# Patient Record
Sex: FEMALE | Race: WHITE | NOT HISPANIC OR LATINO | Employment: UNEMPLOYED | URBAN - METROPOLITAN AREA
[De-identification: names, ages, dates, MRNs, and addresses within clinical notes are randomized per-mention and may not be internally consistent; named-entity substitution may affect disease eponyms.]

---

## 2017-01-13 ENCOUNTER — TELEPHONE (OUTPATIENT)
Dept: ORTHOPEDICS | Facility: CLINIC | Age: 37
End: 2017-01-13

## 2017-01-13 NOTE — TELEPHONE ENCOUNTER
----- Message from Cherie Gotti sent at 1/13/2017 12:14 PM CST -----  Contact: Self - [phone]  Patient returned phone call from nurse. Please call back at [phone]

## 2017-01-13 NOTE — TELEPHONE ENCOUNTER
----- Message from Suzette Lui sent at 1/13/2017  9:37 AM CST -----  Contact: patient  Patient calling in regards to f/u on her pain management referral. She wants to know if the Doctor found someone for her to see. She also wants the Doctor to know she is still having the shoulder pain. Please advise.  Call back .  Thanks!

## 2017-01-30 ENCOUNTER — TELEPHONE (OUTPATIENT)
Dept: ORTHOPEDICS | Facility: CLINIC | Age: 37
End: 2017-01-30

## 2017-01-30 NOTE — TELEPHONE ENCOUNTER
----- Message from Valencia Hernandes sent at 1/30/2017  8:10 AM CST -----  Patient requested refill on  Percet 5, call into pharmacy below. Please call patient at  [phone] if you have any questions. Thank you.         [facility] [place] - [address] [place] [address]  Phone: [phone] Fax: [phone]

## 2017-01-30 NOTE — TELEPHONE ENCOUNTER
----- Message from Geovani Moreland sent at 1/30/2017  2:16 PM CST -----  Contact: Self-   [phone]  Patient states she is returning the nurse phone call. Call was placed to the pod.Thanks!

## 2017-01-30 NOTE — TELEPHONE ENCOUNTER
Spoke to patient and advised that Dr. Shannon recommends that she speak to her pain management dr as she sees Dr. Jones. Patient stated understanding.

## 2017-01-31 PROBLEM — E78.5 HYPERLIPIDEMIA: Status: ACTIVE | Noted: 2017-01-31

## 2018-06-05 ENCOUNTER — HOSPITAL ENCOUNTER (EMERGENCY)
Facility: HOSPITAL | Age: 38
End: 2018-06-05
Attending: EMERGENCY MEDICINE

## 2018-06-05 VITALS
SYSTOLIC BLOOD PRESSURE: 128 MMHG | HEART RATE: 80 BPM | HEIGHT: 63 IN | TEMPERATURE: 98 F | BODY MASS INDEX: 25.52 KG/M2 | OXYGEN SATURATION: 100 % | WEIGHT: 144 LBS | RESPIRATION RATE: 20 BRPM | DIASTOLIC BLOOD PRESSURE: 84 MMHG

## 2018-06-05 DIAGNOSIS — N20.0 KIDNEY STONE ON LEFT SIDE: Primary | ICD-10-CM

## 2018-06-05 LAB
BACTERIA #/AREA URNS HPF: ABNORMAL /HPF
BILIRUB UR QL STRIP: NEGATIVE
CLARITY UR: CLEAR
COLOR UR: YELLOW
GLUCOSE UR QL STRIP: NEGATIVE
HGB UR QL STRIP: ABNORMAL
KETONES UR QL STRIP: NEGATIVE
LEUKOCYTE ESTERASE UR QL STRIP: NEGATIVE
MICROSCOPIC COMMENT: ABNORMAL
NITRITE UR QL STRIP: NEGATIVE
PH UR STRIP: 6 [PH] (ref 5–8)
PROT UR QL STRIP: ABNORMAL
RBC #/AREA URNS HPF: >100 /HPF (ref 0–4)
SP GR UR STRIP: 1.02 (ref 1–1.03)
URN SPEC COLLECT METH UR: ABNORMAL
UROBILINOGEN UR STRIP-ACNC: NEGATIVE EU/DL
WBC #/AREA URNS HPF: 2 /HPF (ref 0–5)

## 2018-06-05 PROCEDURE — 63600175 PHARM REV CODE 636 W HCPCS: Performed by: EMERGENCY MEDICINE

## 2018-06-05 PROCEDURE — 74176 CT ABD & PELVIS W/O CONTRAST: CPT | Mod: 26,,, | Performed by: RADIOLOGY

## 2018-06-05 PROCEDURE — 74176 CT ABD & PELVIS W/O CONTRAST: CPT | Mod: TC

## 2018-06-05 PROCEDURE — 81000 URINALYSIS NONAUTO W/SCOPE: CPT

## 2018-06-05 PROCEDURE — 96374 THER/PROPH/DIAG INJ IV PUSH: CPT

## 2018-06-05 PROCEDURE — 99284 EMERGENCY DEPT VISIT MOD MDM: CPT | Mod: 25

## 2018-06-05 PROCEDURE — 25000003 PHARM REV CODE 250: Performed by: EMERGENCY MEDICINE

## 2018-06-05 RX ORDER — SODIUM CHLORIDE 9 MG/ML
1000 INJECTION, SOLUTION INTRAVENOUS
Status: COMPLETED | OUTPATIENT
Start: 2018-06-05 | End: 2018-06-05

## 2018-06-05 RX ORDER — KETOROLAC TROMETHAMINE 30 MG/ML
30 INJECTION, SOLUTION INTRAMUSCULAR; INTRAVENOUS
Status: COMPLETED | OUTPATIENT
Start: 2018-06-05 | End: 2018-06-05

## 2018-06-05 RX ADMIN — SODIUM CHLORIDE 1000 ML: 9 INJECTION, SOLUTION INTRAVENOUS at 02:06

## 2018-06-05 RX ADMIN — KETOROLAC TROMETHAMINE 30 MG: 30 INJECTION, SOLUTION INTRAMUSCULAR at 02:06

## 2018-06-05 NOTE — ED PROVIDER NOTES
Encounter Date: 6/5/2018       History     Chief Complaint   Patient presents with    Flank Pain    Abdominal Pain    Urinary Retention     This is a 38-year-old white female here with complaint of flank pain radiating into her groin area since today.  She states it feels like another kidney stone.  Patient has a had a complete hysterectomy.          Review of patient's allergies indicates:   Allergen Reactions    Penicillins Hives    Tramadol Rash     Past Medical History:   Diagnosis Date    Anxiety     Asthma     Bipolar 1 disorder     Bipolar 2 disorder     Chronic back pain     Depression     Kidney stone     PTSD (post-traumatic stress disorder)     Spinal stenosis      Past Surgical History:   Procedure Laterality Date    HYSTERECTOMY      KNEE SURGERY      TOTAL ABDOMINAL HYSTERECTOMY W/ BILATERAL SALPINGOOPHORECTOMY       History reviewed. No pertinent family history.  Social History   Substance Use Topics    Smoking status: Current Every Day Smoker    Smokeless tobacco: Not on file    Alcohol use No     Review of Systems   Genitourinary: Positive for flank pain.   Musculoskeletal: Positive for back pain.   All other systems reviewed and are negative.      Physical Exam     Initial Vitals [06/05/18 1422]   BP Pulse Resp Temp SpO2   128/84 80 20 98.3 °F (36.8 °C) 100 %      MAP       98.67         Physical Exam    Nursing note and vitals reviewed.  Constitutional: She appears well-developed and well-nourished.   HENT:   Head: Normocephalic and atraumatic.   Right Ear: External ear normal.   Left Ear: External ear normal.   Nose: Nose normal.   Mouth/Throat: Oropharynx is clear and moist.   Eyes: Conjunctivae and EOM are normal. Pupils are equal, round, and reactive to light.   Neck: Normal range of motion. Neck supple. No thyromegaly present.   Cardiovascular: Normal rate, regular rhythm, normal heart sounds and intact distal pulses.   No murmur heard.  Pulmonary/Chest: Breath sounds  normal. No stridor. No respiratory distress. She has no wheezes. She has no rales.   Abdominal: Soft. Bowel sounds are normal. There is tenderness in the suprapubic area and left lower quadrant. There is CVA tenderness. There is no rebound and no guarding.       Positive CVA tenderness on the left-hand side.  Radiating into her left inguinal area.   Musculoskeletal: Normal range of motion.   Lymphadenopathy:     She has no cervical adenopathy.   Neurological: She is alert and oriented to person, place, and time. She has normal strength and normal reflexes. No cranial nerve deficit.   Skin: Skin is warm and dry. Capillary refill takes less than 2 seconds.   Psychiatric: She has a normal mood and affect. Her behavior is normal. Judgment and thought content normal.         ED Course   Procedures  Labs Reviewed   URINALYSIS, REFLEX TO URINE CULTURE   URINALYSIS, REFLEX TO URINE CULTURE   PREGNANCY TEST, URINE RAPID                                  Clinical Impression:   The encounter diagnosis was Kidney stone on left side.    No orders to display                              Melvin Serrano MD  06/05/18 4035

## 2018-06-05 NOTE — DISCHARGE INSTRUCTIONS
CONTINUE current medicines as perscribed RETURN to the ER if Sx worsen. TYLENOL and /or MOTRIN for fever and pain as needed  UROLOGY follow up in the next 48hrs

## 2019-06-14 ENCOUNTER — HOSPITAL ENCOUNTER (EMERGENCY)
Facility: HOSPITAL | Age: 39
Discharge: HOME | End: 2019-06-15
Attending: INTERNAL MEDICINE
Payer: COMMERCIAL

## 2019-06-14 VITALS
WEIGHT: 150 LBS | RESPIRATION RATE: 20 BRPM | OXYGEN SATURATION: 98 % | DIASTOLIC BLOOD PRESSURE: 92 MMHG | HEIGHT: 63 IN | BODY MASS INDEX: 26.58 KG/M2 | TEMPERATURE: 98 F | HEART RATE: 92 BPM | SYSTOLIC BLOOD PRESSURE: 121 MMHG

## 2019-06-14 DIAGNOSIS — S93.492A SPRAIN OF ANTERIOR TALOFIBULAR LIGAMENT OF LEFT ANKLE, INITIAL ENCOUNTER: Primary | ICD-10-CM

## 2019-06-14 DIAGNOSIS — R52 PAIN: ICD-10-CM

## 2019-06-14 PROCEDURE — 73630 XR FOOT COMPLETE 3 VIEW RIGHT: ICD-10-PCS | Mod: 26,RT,, | Performed by: RADIOLOGY

## 2019-06-14 PROCEDURE — 99284 EMERGENCY DEPT VISIT MOD MDM: CPT | Mod: 25

## 2019-06-14 PROCEDURE — 73630 X-RAY EXAM OF FOOT: CPT | Mod: TC,FY,RT

## 2019-06-14 PROCEDURE — 73630 X-RAY EXAM OF FOOT: CPT | Mod: 26,RT,, | Performed by: RADIOLOGY

## 2019-06-14 PROCEDURE — 96372 THER/PROPH/DIAG INJ SC/IM: CPT

## 2019-06-15 PROCEDURE — 63600175 PHARM REV CODE 636 W HCPCS: Performed by: INTERNAL MEDICINE

## 2019-06-15 RX ORDER — CYCLOBENZAPRINE HCL 10 MG
10 TABLET ORAL 3 TIMES DAILY PRN
Qty: 15 TABLET | Refills: 0 | Status: SHIPPED | OUTPATIENT
Start: 2019-06-15 | End: 2019-06-20

## 2019-06-15 RX ORDER — KETOROLAC TROMETHAMINE 30 MG/ML
60 INJECTION, SOLUTION INTRAMUSCULAR; INTRAVENOUS
Status: COMPLETED | OUTPATIENT
Start: 2019-06-15 | End: 2019-06-15

## 2019-06-15 RX ORDER — ACETAMINOPHEN AND CODEINE PHOSPHATE 300; 30 MG/1; MG/1
1 TABLET ORAL EVERY 6 HOURS PRN
Qty: 12 TABLET | Refills: 0 | Status: SHIPPED | OUTPATIENT
Start: 2019-06-15 | End: 2019-06-25

## 2019-06-15 RX ADMIN — KETOROLAC TROMETHAMINE 60 MG: 30 INJECTION, SOLUTION INTRAMUSCULAR at 01:06

## 2019-06-15 NOTE — DISCHARGE INSTRUCTIONS
Medications asPrescribed  Rest, ice, elevation,  Follow up with primary care physician if no improvement  Minimize weight-bearing over the next 2 days

## 2019-06-17 NOTE — ED PROVIDER NOTES
Encounter Date: 6/14/2019       History     Chief Complaint   Patient presents with    Foot Pain     right foot pain x few weeks after falling through deck     [facility]  Review of patient's allergies indicates:   Allergen Reactions    Penicillins Hives    Tramadol Rash     Past Medical History:   Diagnosis Date    Anxiety     Asthma     Bipolar 1 disorder     Bipolar 2 disorder     Chronic back pain     Depression     Kidney stone     PTSD (post-traumatic stress disorder)     Spinal stenosis      Past Surgical History:   Procedure Laterality Date    HYSTERECTOMY      KNEE SURGERY      TOTAL ABDOMINAL HYSTERECTOMY W/ BILATERAL SALPINGOOPHORECTOMY       History reviewed. No pertinent family history.  Social History     Tobacco Use    Smoking status: Current Every Day Smoker     Packs/day: 0.50     Types: Cigarettes   Substance Use Topics    Alcohol use: No    Drug use: No     Review of Systems    Physical Exam     Initial Vitals [06/14/19 2330]   BP Pulse Resp Temp SpO2   (!) 121/92 92 20 98.2 °F (36.8 °C) 98 %      MAP       --         Physical Exam    ED Course   Procedures  Labs Reviewed - No data to display       Imaging Results          X-Ray Foot Complete Right (Final result)  Result time 06/15/19 08:33:40    Final result by Kristofer Davidson Jr., MD (06/15/19 08:33:40)                 Impression:      Negative x-rays of the right foot.      Electronically signed by: Kristofer Davidson MD  Date:    06/15/2019  Time:    08:33             Narrative:    EXAMINATION:  XR FOOT COMPLETE 3 VIEW RIGHT    CLINICAL HISTORY:  . Pain, unspecified    TECHNIQUE:  AP, lateral, and oblique views of the right foot were performed.    COMPARISON:  None    FINDINGS:  A fracture or other osseous abnormalities of the bones of the right foot is not seen.  Soft tissue swelling or foreign bodies are not noted.  Abnormal angulation is not demonstrated.                                                      Clinical Impression:        ICD-10-CM ICD-9-CM   1. Sprain of anterior talofibular ligament of left ankle, initial encounter S93.492A 845.09   2. Pain R52 780.96                                Luis Alfredo Pollard MD  06/17/19 2330

## 2019-06-25 ENCOUNTER — HOSPITAL ENCOUNTER (EMERGENCY)
Facility: HOSPITAL | Age: 39
Discharge: HOME | End: 2019-06-25
Attending: FAMILY MEDICINE
Payer: COMMERCIAL

## 2019-06-25 VITALS
SYSTOLIC BLOOD PRESSURE: 122 MMHG | HEIGHT: 63 IN | WEIGHT: 155 LBS | OXYGEN SATURATION: 100 % | RESPIRATION RATE: 16 BRPM | DIASTOLIC BLOOD PRESSURE: 79 MMHG | HEART RATE: 86 BPM | BODY MASS INDEX: 27.46 KG/M2 | TEMPERATURE: 99 F

## 2019-06-25 DIAGNOSIS — M54.31 SCIATICA OF RIGHT SIDE: Primary | ICD-10-CM

## 2019-06-25 DIAGNOSIS — R52 PAIN: ICD-10-CM

## 2019-06-25 PROCEDURE — 73502 X-RAY EXAM HIP UNI 2-3 VIEWS: CPT | Mod: TC,FY,RT

## 2019-06-25 PROCEDURE — 25000003 PHARM REV CODE 250: Performed by: FAMILY MEDICINE

## 2019-06-25 PROCEDURE — 99283 EMERGENCY DEPT VISIT LOW MDM: CPT | Mod: 25

## 2019-06-25 PROCEDURE — 73502 XR HIP 2 VIEW RIGHT: ICD-10-PCS | Mod: 26,RT,, | Performed by: RADIOLOGY

## 2019-06-25 PROCEDURE — 73502 X-RAY EXAM HIP UNI 2-3 VIEWS: CPT | Mod: 26,RT,, | Performed by: RADIOLOGY

## 2019-06-25 RX ORDER — HYDROCODONE BITARTRATE AND ACETAMINOPHEN 5; 325 MG/1; MG/1
2 TABLET ORAL
Status: COMPLETED | OUTPATIENT
Start: 2019-06-25 | End: 2019-06-25

## 2019-06-25 RX ORDER — MELOXICAM 15 MG/1
15 TABLET ORAL DAILY
Qty: 14 TABLET | Refills: 1 | Status: SHIPPED | OUTPATIENT
Start: 2019-06-25 | End: 2020-10-05 | Stop reason: CLARIF

## 2019-06-25 RX ADMIN — HYDROCODONE BITARTRATE AND ACETAMINOPHEN 2 TABLET: 5; 325 TABLET ORAL at 10:06

## 2019-06-26 NOTE — ED PROVIDER NOTES
Encounter Date: 6/25/2019       History     Chief Complaint   Patient presents with    right hip pain     back of buttock, radiates down leg.  Pt says she can not bear weight.  Started hurting while cleaning house.  No trauma or injury     39-year-old female presents complaining of pain to the right hip area and the right buttock patient states that she denies at heavy lifting or unusual movement over the last few days no history of cough chills fever dysuria or headache        Review of patient's allergies indicates:   Allergen Reactions    Penicillins Hives    Tramadol Rash     Past Medical History:   Diagnosis Date    Anxiety     Asthma     Bipolar 1 disorder     Bipolar 2 disorder     Chronic back pain     Depression     Kidney stone     PTSD (post-traumatic stress disorder)     Spinal stenosis      Past Surgical History:   Procedure Laterality Date    HYSTERECTOMY      KNEE SURGERY      TOTAL ABDOMINAL HYSTERECTOMY W/ BILATERAL SALPINGOOPHORECTOMY       History reviewed. No pertinent family history.  Social History     Tobacco Use    Smoking status: Current Every Day Smoker     Packs/day: 0.50     Types: Cigarettes   Substance Use Topics    Alcohol use: No    Drug use: No     Review of Systems   Constitutional: Negative for fever.   HENT: Negative for sore throat.    Respiratory: Negative for shortness of breath.    Cardiovascular: Negative for chest pain.   Gastrointestinal: Negative for nausea.   Genitourinary: Negative for dysuria.   Musculoskeletal: Negative for back pain.   Skin: Negative for rash.   Neurological: Negative for weakness.   Hematological: Does not bruise/bleed easily.       Physical Exam     Initial Vitals [06/25/19 2055]   BP Pulse Resp Temp SpO2   122/79 86 16 98.6 °F (37 °C) 100 %      MAP       --         Physical Exam    Nursing note and vitals reviewed.  Constitutional: She appears well-developed and well-nourished. She is not diaphoretic. No distress.   HENT:   Head:  Normocephalic and atraumatic.   Right Ear: External ear normal.   Left Ear: External ear normal.   Eyes: Pupils are equal, round, and reactive to light. Right eye exhibits no discharge. Left eye exhibits no discharge.   Neck: No tracheal deviation present. No JVD present.   Cardiovascular: Exam reveals no friction rub.    No murmur heard.  Pulmonary/Chest: No stridor. No respiratory distress. She has no wheezes. She has no rales.   Abdominal: Bowel sounds are normal. She exhibits no distension.   Musculoskeletal: Normal range of motion.   Positive tenderness to the right hip   Neurological: She is alert.   Skin: Skin is warm.   Psychiatric: She has a normal mood and affect.         ED Course   Procedures  Labs Reviewed - No data to display       Imaging Results          X-Ray Hip 2 View Right (In process)                                       Clinical Impression:       ICD-10-CM ICD-9-CM   1. Sciatica of right side M54.31 724.3   2. Pain R52 780.96                                Roland King MD  06/26/19 0512

## 2019-07-08 DIAGNOSIS — M25.511 RIGHT SHOULDER PAIN, UNSPECIFIED CHRONICITY: Primary | ICD-10-CM

## 2019-07-31 ENCOUNTER — HOSPITAL ENCOUNTER (EMERGENCY)
Facility: HOSPITAL | Age: 39
Discharge: HOME | End: 2019-07-31
Attending: FAMILY MEDICINE
Payer: COMMERCIAL

## 2019-07-31 VITALS
RESPIRATION RATE: 18 BRPM | WEIGHT: 155 LBS | OXYGEN SATURATION: 100 % | HEART RATE: 88 BPM | HEIGHT: 69 IN | SYSTOLIC BLOOD PRESSURE: 132 MMHG | DIASTOLIC BLOOD PRESSURE: 76 MMHG | TEMPERATURE: 99 F | BODY MASS INDEX: 22.96 KG/M2

## 2019-07-31 DIAGNOSIS — H92.02 LEFT EAR PAIN: Primary | ICD-10-CM

## 2019-07-31 PROCEDURE — 99283 EMERGENCY DEPT VISIT LOW MDM: CPT

## 2019-07-31 RX ORDER — FLUTICASONE PROPIONATE 50 MCG
2 SPRAY, SUSPENSION (ML) NASAL DAILY PRN
Qty: 15 G | Refills: 0 | Status: SHIPPED | OUTPATIENT
Start: 2019-07-31 | End: 2019-08-10

## 2019-07-31 NOTE — ED PROVIDER NOTES
Encounter Date: 7/31/2019       History     Chief Complaint   Patient presents with    Otalgia     Sarah Artis is a 39 y.o female with PMHx including anxiety, asthma, bipolar, chronic depression, kidney stone and PTSD. She presents to ED with complaint of left     She reports cough and congestion for 1 week    No fever, body aches or chills            Review of patient's allergies indicates:   Allergen Reactions    Penicillins Hives    Tramadol Rash     Past Medical History:   Diagnosis Date    Anxiety     Asthma     Bipolar 1 disorder     Bipolar 2 disorder     Chronic back pain     Depression     Kidney stone     PTSD (post-traumatic stress disorder)     Spinal stenosis      Past Surgical History:   Procedure Laterality Date    HYSTERECTOMY      KNEE SURGERY      TOTAL ABDOMINAL HYSTERECTOMY W/ BILATERAL SALPINGOOPHORECTOMY       No family history on file.  Social History     Tobacco Use    Smoking status: Current Every Day Smoker     Packs/day: 0.50     Types: Cigarettes   Substance Use Topics    Alcohol use: No    Drug use: No     Review of Systems   Constitutional: Negative for fever.   HENT: Negative for sore throat.    Respiratory: Negative for shortness of breath.    Cardiovascular: Negative for chest pain.   Gastrointestinal: Negative for nausea.   Genitourinary: Negative for dysuria.   Musculoskeletal: Negative for back pain.   Skin: Negative for rash.   Neurological: Negative for weakness.   Hematological: Does not bruise/bleed easily.       Physical Exam     Initial Vitals [07/31/19 1440]   BP Pulse Resp Temp SpO2   132/76 88 18 98.9 °F (37.2 °C) 100 %      MAP       --         Physical Exam    Nursing note and vitals reviewed.  Constitutional: She appears well-developed and well-nourished. She is not diaphoretic. No distress.   HENT:   Head: Normocephalic and atraumatic.   Right Ear: External ear normal.   Left Ear: External ear normal.   Eyes: Pupils are equal, round, and reactive  to light. Right eye exhibits no discharge. Left eye exhibits no discharge.   Neck: No tracheal deviation present. No JVD present.   Cardiovascular: Exam reveals no friction rub.    No murmur heard.  Pulmonary/Chest: No stridor. No respiratory distress. She has no wheezes. She has no rales.   Abdominal: Bowel sounds are normal. She exhibits no distension.   Musculoskeletal: Normal range of motion.   Neurological: She is alert.   Skin: Skin is warm.   Psychiatric: She has a normal mood and affect.         ED Course   Procedures  Labs Reviewed - No data to display       Imaging Results    None                               Clinical Impression:       ICD-10-CM ICD-9-CM   1. Left ear pain H92.02 388.70                                Roland King MD  08/01/19 1707       Roland King MD  08/07/19 0544

## 2019-07-31 NOTE — DISCHARGE INSTRUCTIONS
Over the counter decongestants    Take medication as prescribed    Motrin for pain     Warm compresses

## 2019-11-04 ENCOUNTER — HOSPITAL ENCOUNTER (OUTPATIENT)
Dept: RADIOLOGY | Facility: HOSPITAL | Age: 39
Discharge: HOME | End: 2019-11-04
Attending: ORTHOPAEDIC SURGERY
Payer: COMMERCIAL

## 2019-11-04 ENCOUNTER — OFFICE VISIT (OUTPATIENT)
Dept: ORTHOPEDICS | Facility: CLINIC | Age: 39
End: 2019-11-04
Payer: COMMERCIAL

## 2019-11-04 VITALS
HEART RATE: 87 BPM | WEIGHT: 155 LBS | BODY MASS INDEX: 22.96 KG/M2 | DIASTOLIC BLOOD PRESSURE: 79 MMHG | SYSTOLIC BLOOD PRESSURE: 124 MMHG | HEIGHT: 69 IN

## 2019-11-04 DIAGNOSIS — M19.011 ARTHRITIS OF RIGHT ACROMIOCLAVICULAR JOINT: ICD-10-CM

## 2019-11-04 DIAGNOSIS — M75.81 ROTATOR CUFF TENDINITIS, RIGHT: Primary | ICD-10-CM

## 2019-11-04 DIAGNOSIS — M25.511 RIGHT SHOULDER PAIN, UNSPECIFIED CHRONICITY: ICD-10-CM

## 2019-11-04 DIAGNOSIS — M75.21 BICEPS TENDONITIS, RIGHT: ICD-10-CM

## 2019-11-04 DIAGNOSIS — M19.011 GLENOHUMERAL ARTHRITIS, RIGHT: ICD-10-CM

## 2019-11-04 PROCEDURE — 99999 PR PBB SHADOW E&M-EST. PATIENT-LVL III: ICD-10-PCS | Mod: PBBFAC,,, | Performed by: ORTHOPAEDIC SURGERY

## 2019-11-04 PROCEDURE — 3008F BODY MASS INDEX DOCD: CPT | Mod: S$GLB,,, | Performed by: ORTHOPAEDIC SURGERY

## 2019-11-04 PROCEDURE — 99204 PR OFFICE/OUTPT VISIT, NEW, LEVL IV, 45-59 MIN: ICD-10-PCS | Mod: 25,S$GLB,, | Performed by: ORTHOPAEDIC SURGERY

## 2019-11-04 PROCEDURE — 73030 X-RAY EXAM OF SHOULDER: CPT | Mod: 26,RT,, | Performed by: RADIOLOGY

## 2019-11-04 PROCEDURE — 20610 DRAIN/INJ JOINT/BURSA W/O US: CPT | Mod: RT,S$GLB,, | Performed by: ORTHOPAEDIC SURGERY

## 2019-11-04 PROCEDURE — 73030 X-RAY EXAM OF SHOULDER: CPT | Mod: TC,FY,RT

## 2019-11-04 PROCEDURE — 99999 PR PBB SHADOW E&M-EST. PATIENT-LVL III: CPT | Mod: PBBFAC,,, | Performed by: ORTHOPAEDIC SURGERY

## 2019-11-04 PROCEDURE — 3008F PR BODY MASS INDEX (BMI) DOCUMENTED: ICD-10-PCS | Mod: S$GLB,,, | Performed by: ORTHOPAEDIC SURGERY

## 2019-11-04 PROCEDURE — 73030 XR SHOULDER COMPLETE 2 OR MORE VIEWS RIGHT: ICD-10-PCS | Mod: 26,RT,, | Performed by: RADIOLOGY

## 2019-11-04 PROCEDURE — 20610 LARGE JOINT ASPIRATION/INJECTION: R GLENOHUMERAL: ICD-10-PCS | Mod: RT,S$GLB,, | Performed by: ORTHOPAEDIC SURGERY

## 2019-11-04 PROCEDURE — 99204 OFFICE O/P NEW MOD 45 MIN: CPT | Mod: 25,S$GLB,, | Performed by: ORTHOPAEDIC SURGERY

## 2019-11-04 RX ADMIN — TRIAMCINOLONE ACETONIDE 40 MG: 40 INJECTION, SUSPENSION INTRA-ARTICULAR; INTRAMUSCULAR at 10:11

## 2019-11-05 PROBLEM — M75.81 ROTATOR CUFF TENDINITIS, RIGHT: Status: ACTIVE | Noted: 2019-11-05

## 2019-11-05 PROBLEM — M19.011 ARTHRITIS OF RIGHT ACROMIOCLAVICULAR JOINT: Status: ACTIVE | Noted: 2019-11-05

## 2019-11-05 PROBLEM — M75.21 BICEPS TENDONITIS, RIGHT: Status: ACTIVE | Noted: 2019-11-05

## 2019-11-05 PROBLEM — M19.011 GLENOHUMERAL ARTHRITIS, RIGHT: Status: ACTIVE | Noted: 2019-11-05

## 2019-11-05 RX ORDER — TRIAMCINOLONE ACETONIDE 40 MG/ML
40 INJECTION, SUSPENSION INTRA-ARTICULAR; INTRAMUSCULAR
Status: DISCONTINUED | OUTPATIENT
Start: 2019-11-04 | End: 2019-11-05 | Stop reason: HOSPADM

## 2019-11-06 NOTE — PROGRESS NOTES
Subjective:      Patient ID: Sarah Artis is a 39 y.o. female.    Chief Complaint: Pain of the Right Shoulder    Referring Provider: Aaareferral Self  No address on file    HPI:  Ms. Artis is a 39-year-old right-hand-dominant lady who presented today for evaluation of 1 month of right shoulder pain which began when she awoke 1 morning and had shoulder pain. She had right shoulder issues in  which began after she picked up a box.  She ended up proceeding with surgery in 2015 and then later that year she had a repeat revision rotator cuff repair in 2015.  She was doing well until a month ago when she awoke with the pain. Forward flexion in abduction increases her symptoms while heating pad decreases them.  Currently her symptoms awaken her at night.  She has not taken NSAIDs, done physical therapy, nor had injections.    Past Medical History:   Diagnosis Date    Anxiety     Asthma     Bipolar 1 disorder     Bipolar 2 disorder     Chronic back pain     Depression     Kidney stone     PTSD (post-traumatic stress disorder)      *  * Spinal stenosis  Headaches  Fibromyalgia      Past Surgical History:   Procedure Laterality Date    HYSTERECTOMY      KNEE SURGERY arthroscopy left knee       *  *  * TOTAL ABDOMINAL HYSTERECTOMY W/ BILATERAL SALPINGOOPHORECTOMY  TUBAL LIGATION  ARTHROSCOPY RIGHT SHOULDER WITH ROTATOR CUFF REPAIR TWICE         Review of patient's allergies indicates:   Allergen Reactions    Penicillins Hives    Tramadol Rash       Social History     Occupational History    Homemaker   Tobacco Use    Smoking status: Current Every Day Smoker     Packs/day: 0.50     Types: Cigarettes   Substance and Sexual Activity    Alcohol use: No    Drug use: No    Sexual activity: Not Currently      Family history:  Father:  Alive, hypertension/hyperlipidemia/asthma/stroke.  Mother:  Alive, hypertension.  Brother:  2, 1 , MVA.  Sister:  3, alive, muscular  dystrophy.  Son:  1, alive, denied medical problems.  Daughter:  1, alive, denied medical problems.    Previous Hospitalizations:  Childbirth.    ROS:   Review of Systems   Constitution: Negative for chills and fever.   HENT: Negative for congestion.    Eyes: Negative for blurred vision.   Cardiovascular: Negative for chest pain.   Respiratory: Negative for cough.    Endocrine: Negative for polydipsia.   Hematologic/Lymphatic: Negative for adenopathy.   Skin: Negative for flushing and itching.   Musculoskeletal: Positive for joint pain. Negative for gout.   Gastrointestinal: Negative for constipation, diarrhea and heartburn.   Genitourinary: Negative for nocturia.   Neurological: Positive for headaches. Negative for seizures.   Psychiatric/Behavioral: Positive for depression. Negative for substance abuse. The patient is nervous/anxious.    Allergic/Immunologic: Negative for environmental allergies.           Objective:      Physical Exam:   General: AAOx3.  No acute distress  HEENT: Normocephalic, PEARLA EOMI, poor dentition  Neck: Supple, No JVD  Chest: Symetric, equal excursion on inspiration  Abdomen: Soft NTND  Vascular:  Pulses intact and equal bilaterally.  Capillary refill less than 3 seconds and equal bilaterally  Neurologic:  Pinprick and soft touch intact and equal bilaterally  Integment:  No ecchymosis, no errythema  Extremity:  Shoulder:  Forward flexion/abduction equal bilaterally 0/170 degrees. Internal rotation equal bilaterally T10.  Negative drop-arm both shoulders.  Negative lift-off both shoulders.  External rotation equal bilaterally 0/25 degrees. Full can mildly positive right shoulder.  Empty can mildly positive right shoulder.  Wright/Neer positive right shoulder.  Cross-arm negative both shoulders.  Nontender over the AC joint both shoulders.  Tender in the bicipital groove right shoulder.  Yergason's negative both shoulders.  Apprehension/relocation negative both  Radiography:  Personally  reviewed shoulders.  X-rays of the right shoulder completed on 11/04/2019 showed postsurgical changes consistent with a rotator cuff repair with suture anchors in the humeral head AC arthritis AC trauma pitting glenohumeral arthritis with humeral head early osteophyte and Hill-Sachs lesion.      Assessment:       Impression:      1. Rotator cuff tendinitis, right    2. Biceps tendonitis, right    3. Arthritis of right acromioclavicular joint    4. Glenohumeral arthritis, right          Plan:       1.  Discussed physical examination and radiographic findings with the patient. Sarah understands that she has what appears to be rotator cuff tendinitis and biceps tendinitis long with arthritis of her shoulder she understands she may have a partial-thickness tear of her rotator cuff but since she has not completed conservative management recommend she trial conservative care and if she fails conservative care then consider surgical intervention.  2.  Offered a steroid injection to the right shoulder, she elected to proceed.  3.  Home exercises to include rotator cuff exercises were shown discussed with the patient.  4.  Offered referred to physical therapy declined for now.  5.  Take NSAIDs as tolerated and allowed by PCM.  6.  [facility] portal was discussed with the patient and information was given.  The patient was encouraged to use the portal for future encounters.  7.  Follow up p.r.n..

## 2019-11-06 NOTE — PROCEDURES
Large Joint Aspiration/Injection: R glenohumeral  Date/Time: 11/4/2019 10:00 AM  Performed by: Pollo Ortega DO  Authorized by: Pollo Ortega, DO     Consent Done?:  Yes (Verbal)  Indications:  Pain and diagnostic evaluation  Procedure site marked: Yes    Timeout: Prior to procedure the correct patient, procedure, and site was verified      Location:  Shoulder  Site:  R glenohumeral  Prep: Patient was prepped and draped in usual sterile fashion    Needle size:  22 G  Ultrasonic Guidance for needle placement: No  Approach:  Posterior  Medications:  40 mg triamcinolone acetonide 40 mg/mL  Patient tolerance:  Patient tolerated the procedure well with no immediate complications

## 2020-07-03 ENCOUNTER — HOSPITAL ENCOUNTER (EMERGENCY)
Facility: HOSPITAL | Age: 40
Discharge: HOME | End: 2020-07-03
Attending: EMERGENCY MEDICINE
Payer: COMMERCIAL

## 2020-07-03 VITALS
RESPIRATION RATE: 18 BRPM | HEART RATE: 80 BPM | OXYGEN SATURATION: 99 % | HEIGHT: 63 IN | WEIGHT: 165 LBS | TEMPERATURE: 99 F | BODY MASS INDEX: 29.23 KG/M2 | SYSTOLIC BLOOD PRESSURE: 125 MMHG | DIASTOLIC BLOOD PRESSURE: 77 MMHG

## 2020-07-03 DIAGNOSIS — N20.0 BILATERAL RENAL STONES: ICD-10-CM

## 2020-07-03 DIAGNOSIS — N20.1 CALCULUS OF URETEROVESICAL JUNCTION (UVJ): ICD-10-CM

## 2020-07-03 DIAGNOSIS — E87.6 HYPOKALEMIA: ICD-10-CM

## 2020-07-03 DIAGNOSIS — N13.30 HYDROURETERONEPHROSIS: Primary | ICD-10-CM

## 2020-07-03 LAB
ALBUMIN SERPL BCP-MCNC: 4 G/DL (ref 3.5–5.2)
ALP SERPL-CCNC: 79 U/L (ref 55–135)
ALT SERPL W/O P-5'-P-CCNC: 21 U/L (ref 10–44)
AMORPH CRY URNS QL MICRO: ABNORMAL
ANION GAP SERPL CALC-SCNC: 11 MMOL/L (ref 8–16)
AST SERPL-CCNC: 20 U/L (ref 10–40)
BACTERIA #/AREA URNS HPF: ABNORMAL /HPF
BASOPHILS # BLD AUTO: 0.04 K/UL (ref 0–0.2)
BASOPHILS NFR BLD: 0.5 % (ref 0–1.9)
BILIRUB SERPL-MCNC: 0.4 MG/DL (ref 0.1–1)
BILIRUB UR QL STRIP: NEGATIVE
BUN SERPL-MCNC: 10 MG/DL (ref 6–20)
CALCIUM SERPL-MCNC: 8.7 MG/DL (ref 8.7–10.5)
CHLORIDE SERPL-SCNC: 105 MMOL/L (ref 95–110)
CLARITY UR: ABNORMAL
CO2 SERPL-SCNC: 25 MMOL/L (ref 23–29)
COLOR UR: ABNORMAL
CREAT SERPL-MCNC: 0.6 MG/DL (ref 0.5–1.4)
DIFFERENTIAL METHOD: ABNORMAL
EOSINOPHIL # BLD AUTO: 0.2 K/UL (ref 0–0.5)
EOSINOPHIL NFR BLD: 2.8 % (ref 0–8)
ERYTHROCYTE [DISTWIDTH] IN BLOOD BY AUTOMATED COUNT: 13.1 % (ref 11.5–14.5)
EST. GFR  (AFRICAN AMERICAN): >60 ML/MIN/1.73 M^2
EST. GFR  (NON AFRICAN AMERICAN): >60 ML/MIN/1.73 M^2
GLUCOSE SERPL-MCNC: 112 MG/DL (ref 70–110)
GLUCOSE UR QL STRIP: NEGATIVE
HCT VFR BLD AUTO: 37.1 % (ref 37–48.5)
HGB BLD-MCNC: 12 G/DL (ref 12–16)
HGB UR QL STRIP: ABNORMAL
HYALINE CASTS #/AREA URNS LPF: 0 /LPF
IMM GRANULOCYTES # BLD AUTO: 0.02 K/UL (ref 0–0.04)
IMM GRANULOCYTES NFR BLD AUTO: 0.2 % (ref 0–0.5)
KETONES UR QL STRIP: NEGATIVE
LEUKOCYTE ESTERASE UR QL STRIP: NEGATIVE
LYMPHOCYTES # BLD AUTO: 2.3 K/UL (ref 1–4.8)
LYMPHOCYTES NFR BLD: 27 % (ref 18–48)
MCH RBC QN AUTO: 27.1 PG (ref 27–31)
MCHC RBC AUTO-ENTMCNC: 32.3 G/DL (ref 32–36)
MCV RBC AUTO: 84 FL (ref 82–98)
MICROSCOPIC COMMENT: ABNORMAL
MONOCYTES # BLD AUTO: 0.7 K/UL (ref 0.3–1)
MONOCYTES NFR BLD: 7.6 % (ref 4–15)
NEUTROPHILS # BLD AUTO: 5.3 K/UL (ref 1.8–7.7)
NEUTROPHILS NFR BLD: 61.9 % (ref 38–73)
NITRITE UR QL STRIP: NEGATIVE
NRBC BLD-RTO: 0 /100 WBC
PH UR STRIP: 7 [PH] (ref 5–8)
PLATELET # BLD AUTO: 365 K/UL (ref 150–350)
PMV BLD AUTO: 9.3 FL (ref 9.2–12.9)
POTASSIUM SERPL-SCNC: 3.1 MMOL/L (ref 3.5–5.1)
PROT SERPL-MCNC: 7.4 G/DL (ref 6–8.4)
PROT UR QL STRIP: ABNORMAL
RBC # BLD AUTO: 4.42 M/UL (ref 4–5.4)
RBC #/AREA URNS HPF: >100 /HPF (ref 0–4)
SODIUM SERPL-SCNC: 141 MMOL/L (ref 136–145)
SP GR UR STRIP: 1.02 (ref 1–1.03)
SQUAMOUS #/AREA URNS HPF: 8 /HPF
URN SPEC COLLECT METH UR: ABNORMAL
UROBILINOGEN UR STRIP-ACNC: NEGATIVE EU/DL
WBC # BLD AUTO: 8.57 K/UL (ref 3.9–12.7)
WBC #/AREA URNS HPF: 6 /HPF (ref 0–5)

## 2020-07-03 PROCEDURE — 99284 EMERGENCY DEPT VISIT MOD MDM: CPT | Mod: 25

## 2020-07-03 PROCEDURE — 74176 CT ABD & PELVIS W/O CONTRAST: CPT | Mod: 26,,, | Performed by: RADIOLOGY

## 2020-07-03 PROCEDURE — 63600175 PHARM REV CODE 636 W HCPCS: Performed by: EMERGENCY MEDICINE

## 2020-07-03 PROCEDURE — 74176 CT RENAL STONE STUDY ABD PELVIS WO: ICD-10-PCS | Mod: 26,,, | Performed by: RADIOLOGY

## 2020-07-03 PROCEDURE — 96365 THER/PROPH/DIAG IV INF INIT: CPT

## 2020-07-03 PROCEDURE — 96375 TX/PRO/DX INJ NEW DRUG ADDON: CPT

## 2020-07-03 PROCEDURE — 80053 COMPREHEN METABOLIC PANEL: CPT

## 2020-07-03 PROCEDURE — 81000 URINALYSIS NONAUTO W/SCOPE: CPT

## 2020-07-03 PROCEDURE — 25000003 PHARM REV CODE 250: Performed by: EMERGENCY MEDICINE

## 2020-07-03 PROCEDURE — 85025 COMPLETE CBC W/AUTO DIFF WBC: CPT

## 2020-07-03 PROCEDURE — 74176 CT ABD & PELVIS W/O CONTRAST: CPT | Mod: TC

## 2020-07-03 PROCEDURE — 96361 HYDRATE IV INFUSION ADD-ON: CPT

## 2020-07-03 RX ORDER — HYDROMORPHONE HYDROCHLORIDE 2 MG/ML
1 INJECTION, SOLUTION INTRAMUSCULAR; INTRAVENOUS; SUBCUTANEOUS
Status: COMPLETED | OUTPATIENT
Start: 2020-07-03 | End: 2020-07-03

## 2020-07-03 RX ORDER — ONDANSETRON 4 MG/1
4 TABLET, FILM COATED ORAL EVERY 8 HOURS PRN
Qty: 12 TABLET | Refills: 0 | Status: SHIPPED | OUTPATIENT
Start: 2020-07-03 | End: 2020-10-05 | Stop reason: CLARIF

## 2020-07-03 RX ORDER — HYDROCODONE BITARTRATE AND ACETAMINOPHEN 5; 325 MG/1; MG/1
1 TABLET ORAL EVERY 8 HOURS PRN
Qty: 8 TABLET | Refills: 0 | OUTPATIENT
Start: 2020-07-03 | End: 2020-07-05

## 2020-07-03 RX ORDER — KETOROLAC TROMETHAMINE 30 MG/ML
15 INJECTION, SOLUTION INTRAMUSCULAR; INTRAVENOUS
Status: COMPLETED | OUTPATIENT
Start: 2020-07-03 | End: 2020-07-03

## 2020-07-03 RX ORDER — CEPHALEXIN 500 MG/1
500 CAPSULE ORAL EVERY 8 HOURS
Qty: 21 CAPSULE | Refills: 0 | Status: SHIPPED | OUTPATIENT
Start: 2020-07-03 | End: 2020-07-10

## 2020-07-03 RX ORDER — KETOROLAC TROMETHAMINE 10 MG/1
10 TABLET, FILM COATED ORAL EVERY 6 HOURS PRN
Qty: 15 TABLET | Refills: 0 | Status: SHIPPED | OUTPATIENT
Start: 2020-07-03 | End: 2020-10-05 | Stop reason: CLARIF

## 2020-07-03 RX ORDER — TAMSULOSIN HYDROCHLORIDE 0.4 MG/1
0.4 CAPSULE ORAL DAILY
Qty: 3 CAPSULE | Refills: 0 | OUTPATIENT
Start: 2020-07-03 | End: 2020-07-05

## 2020-07-03 RX ORDER — ONDANSETRON 2 MG/ML
4 INJECTION INTRAMUSCULAR; INTRAVENOUS
Status: COMPLETED | OUTPATIENT
Start: 2020-07-03 | End: 2020-07-03

## 2020-07-03 RX ADMIN — CEFTRIAXONE 1 G: 1 INJECTION, SOLUTION INTRAVENOUS at 04:07

## 2020-07-03 RX ADMIN — SODIUM CHLORIDE 1000 ML: 0.9 INJECTION, SOLUTION INTRAVENOUS at 03:07

## 2020-07-03 RX ADMIN — KETOROLAC TROMETHAMINE 15 MG: 30 INJECTION, SOLUTION INTRAMUSCULAR at 04:07

## 2020-07-03 RX ADMIN — ONDANSETRON HYDROCHLORIDE 4 MG: 2 SOLUTION INTRAMUSCULAR; INTRAVENOUS at 04:07

## 2020-07-03 RX ADMIN — HYDROMORPHONE HYDROCHLORIDE 1 MG: 2 INJECTION INTRAMUSCULAR; INTRAVENOUS; SUBCUTANEOUS at 05:07

## 2020-07-03 NOTE — ED PROVIDER NOTES
Encounter Date: 7/3/2020       History     Chief Complaint   Patient presents with    Abdominal Pain    Flank Pain     40-year-old female prior history of renal stone and spinal stenosis presenting today with complaints of left flank pain with radiation to the left lower quadrant.  Patient does report recent fall from her porch without loss of consciousness now with onset of pain.  Denies saddle anesthesia.  Denies new numbness, tingling or weakness.  Denies any radiation of pain to the left lower extremity.  Patient reports associated nausea vomiting.  Denies chest pain, shortness of breath, fever or chills.  Denies headache, dizziness or syncope.  Denies specific exacerbating or alleviating factors.  Denies dysuria or frequency.        Review of patient's allergies indicates:   Allergen Reactions    Penicillins Hives    Tramadol Rash     Past Medical History:   Diagnosis Date    Anxiety     Asthma     Bipolar 1 disorder     Bipolar 2 disorder     Chronic back pain     Depression     Kidney stone     PTSD (post-traumatic stress disorder)     Spinal stenosis      Past Surgical History:   Procedure Laterality Date    HYSTERECTOMY      KNEE SURGERY      TOTAL ABDOMINAL HYSTERECTOMY W/ BILATERAL SALPINGOOPHORECTOMY       History reviewed. No pertinent family history.  Social History     Tobacco Use    Smoking status: Current Every Day Smoker     Packs/day: 0.50     Types: Cigarettes   Substance Use Topics    Alcohol use: No    Drug use: No     Review of Systems   Constitutional: Negative for appetite change and fever.   HENT: Negative for congestion, rhinorrhea and sore throat.    Respiratory: Negative for shortness of breath.    Cardiovascular: Negative for chest pain.   Gastrointestinal: Positive for abdominal pain, nausea and vomiting. Negative for diarrhea.   Genitourinary: Positive for flank pain. Negative for dysuria, vaginal bleeding and vaginal discharge.   Musculoskeletal: Negative for back  pain.   Skin: Negative for rash.   Neurological: Negative for dizziness, syncope, weakness and headaches.   Hematological: Does not bruise/bleed easily.       Physical Exam     Initial Vitals [07/03/20 0346]   BP Pulse Resp Temp SpO2   (!) 154/92 72 15 98.7 °F (37.1 °C) 97 %      MAP       --         Physical Exam    Nursing note and vitals reviewed.  Constitutional: She appears well-developed and well-nourished.   HENT:   Head: Normocephalic and atraumatic.   Eyes: Conjunctivae and EOM are normal. Pupils are equal, round, and reactive to light.   Neck: Normal range of motion. Neck supple.   Cardiovascular: Normal rate, regular rhythm, normal heart sounds and intact distal pulses.   Pulmonary/Chest: Breath sounds normal.   Abdominal: Soft. Bowel sounds are normal. There is abdominal tenderness in the left lower quadrant. There is CVA tenderness. There is no rigidity, no rebound, no tenderness at McBurney's point and negative Rivas's sign.   Musculoskeletal: Normal range of motion.   Neurological: She is alert and oriented to person, place, and time.   Skin: Skin is warm and dry. Capillary refill takes less than 2 seconds.         ED Course   Procedures  Labs Reviewed   CBC W/ AUTO DIFFERENTIAL - Abnormal; Notable for the following components:       Result Value    Platelets 365 (*)     All other components within normal limits   COMPREHENSIVE METABOLIC PANEL - Abnormal; Notable for the following components:    Potassium 3.1 (*)     Glucose 112 (*)     All other components within normal limits   URINALYSIS, REFLEX TO URINE CULTURE - Abnormal; Notable for the following components:    Color, UA Red (*)     Appearance, UA Hazy (*)     Protein, UA 2+ (*)     Occult Blood UA 3+ (*)     All other components within normal limits    Narrative:     Specimen Source->Urine   URINALYSIS MICROSCOPIC - Abnormal; Notable for the following components:    RBC, UA >100 (*)     WBC, UA 6 (*)     Bacteria Few (*)     All other  components within normal limits    Narrative:     Specimen Source->Urine          Imaging Results          CT Renal Stone Study ABD Pelvis WO (In process)                  Medical Decision Making:   Initial Assessment:   40-year-old female nontoxic-appearing, afebrile history of prior renal stone and chronic back pain presenting with complaints of left flank pain with radiation to the left lower quadrant.  No urinary symptoms.  Associated nausea/vomiting.  Will send screening labs, CT rule out obstructive uropathy, subacute spinal abnormality.  Symptomatic treatment with reassessment no focal deficit.  No saddle anesthesia.  No bowel/bladder incontinence.  Unlikely cauda equina  Differential Diagnosis:   Preliminary laboratory data without leukocytosis.  No anemia.  Metabolic panel without acidemia.  BUN/creatinine within limits.    Urinalysis with microscopic hematuria, few bacteria/mucus.  Likely secondary to current uropathy however will cover for possible early infection pending urine culture results    Preliminary CT scan of the abdomen pelvis rule out obstructive uropathy with confirmation of mild left hydroureteronephrosis secondary to a 4 x 3 mm calculus at the left UVJ.  A couple tiny punctate nonobstructing bilateral calculi.  No evidence to suggest acute appendicitis.  No evidence of diverticulitis no abdominal aortic aneurysm.  No free intraperitoneal air no calcified gallstones.  No ductal dilation.  No ductal dilation of the pancreas.  No splenic abnormality.  No acute abnormality or mass of the adrenals.  Surgical changes consistent with previous hysterectomy.  Per virtual Radiology  Clinical Tests:   Lab Tests: Ordered and Reviewed  Radiological Study: Ordered and Reviewed  ED Management:  Upon reassessment patient appeared comfortable.  Mild hydroureteronephrosis with stone at the UVJ.  No compromise of renal function on laboratory data.  Patient able to urinate without difficulty.  Symptomatic  treatment provided in coverage for possible infection initiated pending urine culture results.  Patient amenable to close outpatient follow-up with urology.  Will provide symptomatic treatment at home and strict return to ED precautions.                  05:30 patient re-evaluated reassessed.  Patient resting in no acute distress.  Patient appears comfortable.  Respirations even nonlabored.  Patient reports continued left-sided pain but improved from previous evaluation.  Reviewed and discussed laboratory and radiographic data.  Patient amenable to 2nd dose of pain medication and close outpatient follow-up with urology for re-evaluation and definitive treatment.  Discussed return to ED precautions as well as written verbal DC instructions.  Patient verbalized understanding, her med ability to this projected plan of care and all her questions answered to apparent satisfaction.                Clinical Impression:       ICD-10-CM ICD-9-CM   1. Hydroureteronephrosis  N13.30 591   2. Calculus of ureterovesical junction (UVJ)  N20.1 592.1   3. Bilateral renal stones  N20.0 592.0   4. Hypokalemia  E87.6 276.8         Disposition:   Disposition: Discharged  Condition: Stable     ED Disposition Condition    Discharge Stable        ED Prescriptions     Medication Sig Dispense Start Date End Date Auth. Provider    HYDROcodone-acetaminophen (NORCO) 5-325 mg per tablet Take 1 tablet by mouth every 8 (eight) hours as needed for Pain. 8 tablet 7/3/2020  Diego Larson, DO    tamsulosin (FLOMAX) 0.4 mg Cap Take 1 capsule (0.4 mg total) by mouth once daily. for 3 days 3 capsule 7/3/2020 7/6/2020 Diego Larson DO    ondansetron (ZOFRAN) 4 MG tablet Take 1 tablet (4 mg total) by mouth every 8 (eight) hours as needed for Nausea. 12 tablet 7/3/2020  Diego Larson DO    ketorolac (TORADOL) 10 mg tablet Take 1 tablet (10 mg total) by mouth every 6 (six) hours as needed for Pain. 15 tablet 7/3/2020  Diego Larson DO     cephALEXin (KEFLEX) 500 MG capsule Take 1 capsule (500 mg total) by mouth every 8 (eight) hours. for 7 days 21 capsule 7/3/2020 7/10/2020 Diego Larson DO        Follow-up Information     Follow up With Specialties Details Why Contact Info    Gilda Sidhu III, MD Internal Medicine, Cardiology Schedule an appointment as soon as possible for a visit in 2 days for reevaluation [address]  [facility] [place] [address] [phone]      Jeremy Polanco MD Urology Call today to schedule an appointment for reevaluation and definitive treatment [address] [facility] [place] [address] [phone]                                       Diego Larson DO  07/03/20 0609

## 2020-07-03 NOTE — ED NOTES
Patient to ED via [facility]. Patient reports that she began having left flank pain last night and woke up with severe pain at approximately 0200. Patient states pain is radiating into LLQ of abdomen. Hx of kidney stones.

## 2020-07-05 ENCOUNTER — HOSPITAL ENCOUNTER (EMERGENCY)
Facility: HOSPITAL | Age: 40
Discharge: HOME | End: 2020-07-05
Attending: FAMILY MEDICINE

## 2020-07-05 VITALS
DIASTOLIC BLOOD PRESSURE: 89 MMHG | HEIGHT: 63 IN | SYSTOLIC BLOOD PRESSURE: 130 MMHG | HEART RATE: 85 BPM | BODY MASS INDEX: 28.88 KG/M2 | TEMPERATURE: 98 F | OXYGEN SATURATION: 98 % | RESPIRATION RATE: 18 BRPM | WEIGHT: 163 LBS

## 2020-07-05 DIAGNOSIS — N20.1 CALCULUS OF URETEROVESICAL JUNCTION (UVJ): ICD-10-CM

## 2020-07-05 DIAGNOSIS — N20.1 LEFT URETERAL STONE: Primary | ICD-10-CM

## 2020-07-05 LAB
ALBUMIN SERPL BCP-MCNC: 4.2 G/DL (ref 3.5–5.2)
ALP SERPL-CCNC: 83 U/L (ref 55–135)
ALT SERPL W/O P-5'-P-CCNC: 22 U/L (ref 10–44)
ANION GAP SERPL CALC-SCNC: 11 MMOL/L (ref 8–16)
AST SERPL-CCNC: 27 U/L (ref 10–40)
BASOPHILS # BLD AUTO: 0.04 K/UL (ref 0–0.2)
BASOPHILS NFR BLD: 0.3 % (ref 0–1.9)
BILIRUB SERPL-MCNC: 0.5 MG/DL (ref 0.1–1)
BILIRUB UR QL STRIP: NEGATIVE
BUN SERPL-MCNC: 10 MG/DL (ref 6–20)
CALCIUM SERPL-MCNC: 9.1 MG/DL (ref 8.7–10.5)
CHLORIDE SERPL-SCNC: 102 MMOL/L (ref 95–110)
CLARITY UR: CLEAR
CO2 SERPL-SCNC: 25 MMOL/L (ref 23–29)
COLOR UR: YELLOW
CREAT SERPL-MCNC: 1.1 MG/DL (ref 0.5–1.4)
DIFFERENTIAL METHOD: ABNORMAL
EOSINOPHIL # BLD AUTO: 0.1 K/UL (ref 0–0.5)
EOSINOPHIL NFR BLD: 1 % (ref 0–8)
ERYTHROCYTE [DISTWIDTH] IN BLOOD BY AUTOMATED COUNT: 13.2 % (ref 11.5–14.5)
EST. GFR  (AFRICAN AMERICAN): >60 ML/MIN/1.73 M^2
EST. GFR  (NON AFRICAN AMERICAN): >60 ML/MIN/1.73 M^2
GLUCOSE SERPL-MCNC: 92 MG/DL (ref 70–110)
GLUCOSE UR QL STRIP: NEGATIVE
HCT VFR BLD AUTO: 35.6 % (ref 37–48.5)
HGB BLD-MCNC: 11.4 G/DL (ref 12–16)
HGB UR QL STRIP: ABNORMAL
IMM GRANULOCYTES # BLD AUTO: 0.03 K/UL (ref 0–0.04)
IMM GRANULOCYTES NFR BLD AUTO: 0.2 % (ref 0–0.5)
KETONES UR QL STRIP: NEGATIVE
LEUKOCYTE ESTERASE UR QL STRIP: NEGATIVE
LYMPHOCYTES # BLD AUTO: 1.6 K/UL (ref 1–4.8)
LYMPHOCYTES NFR BLD: 12.4 % (ref 18–48)
MCH RBC QN AUTO: 26.8 PG (ref 27–31)
MCHC RBC AUTO-ENTMCNC: 32 G/DL (ref 32–36)
MCV RBC AUTO: 84 FL (ref 82–98)
MONOCYTES # BLD AUTO: 1 K/UL (ref 0.3–1)
MONOCYTES NFR BLD: 8.2 % (ref 4–15)
NEUTROPHILS # BLD AUTO: 9.8 K/UL (ref 1.8–7.7)
NEUTROPHILS NFR BLD: 77.9 % (ref 38–73)
NITRITE UR QL STRIP: NEGATIVE
NRBC BLD-RTO: 0 /100 WBC
PH UR STRIP: 8 [PH] (ref 5–8)
PLATELET # BLD AUTO: 338 K/UL (ref 150–350)
PMV BLD AUTO: 9.2 FL (ref 9.2–12.9)
POTASSIUM SERPL-SCNC: 3.8 MMOL/L (ref 3.5–5.1)
PROT SERPL-MCNC: 7.5 G/DL (ref 6–8.4)
PROT UR QL STRIP: NEGATIVE
RBC # BLD AUTO: 4.25 M/UL (ref 4–5.4)
SODIUM SERPL-SCNC: 138 MMOL/L (ref 136–145)
SP GR UR STRIP: 1.02 (ref 1–1.03)
URN SPEC COLLECT METH UR: ABNORMAL
UROBILINOGEN UR STRIP-ACNC: NEGATIVE EU/DL
WBC # BLD AUTO: 12.55 K/UL (ref 3.9–12.7)

## 2020-07-05 PROCEDURE — 25000003 PHARM REV CODE 250: Performed by: EMERGENCY MEDICINE

## 2020-07-05 PROCEDURE — 99284 EMERGENCY DEPT VISIT MOD MDM: CPT | Mod: 25

## 2020-07-05 PROCEDURE — 96360 HYDRATION IV INFUSION INIT: CPT

## 2020-07-05 PROCEDURE — 81003 URINALYSIS AUTO W/O SCOPE: CPT

## 2020-07-05 PROCEDURE — 85025 COMPLETE CBC W/AUTO DIFF WBC: CPT

## 2020-07-05 PROCEDURE — 80053 COMPREHEN METABOLIC PANEL: CPT

## 2020-07-05 RX ORDER — ONDANSETRON 4 MG/1
4 TABLET, ORALLY DISINTEGRATING ORAL
Status: COMPLETED | OUTPATIENT
Start: 2020-07-05 | End: 2020-07-05

## 2020-07-05 RX ORDER — HYDROCODONE BITARTRATE AND ACETAMINOPHEN 10; 325 MG/1; MG/1
1 TABLET ORAL EVERY 6 HOURS PRN
Qty: 10 TABLET | Refills: 0 | Status: SHIPPED | OUTPATIENT
Start: 2020-07-05 | End: 2020-10-05 | Stop reason: CLARIF

## 2020-07-05 RX ORDER — TAMSULOSIN HYDROCHLORIDE 0.4 MG/1
0.4 CAPSULE ORAL DAILY
Qty: 5 CAPSULE | Refills: 0 | Status: SHIPPED | OUTPATIENT
Start: 2020-07-05 | End: 2020-10-05 | Stop reason: CLARIF

## 2020-07-05 RX ORDER — HYDROCODONE BITARTRATE AND ACETAMINOPHEN 10; 325 MG/1; MG/1
1 TABLET ORAL
Status: COMPLETED | OUTPATIENT
Start: 2020-07-05 | End: 2020-07-05

## 2020-07-05 RX ADMIN — HYDROCODONE BITARTRATE AND ACETAMINOPHEN 1 TABLET: 10; 325 TABLET ORAL at 06:07

## 2020-07-05 RX ADMIN — ONDANSETRON 4 MG: 4 TABLET, ORALLY DISINTEGRATING ORAL at 06:07

## 2020-07-05 RX ADMIN — SODIUM CHLORIDE 1000 ML: 9 INJECTION, SOLUTION INTRAVENOUS at 06:07

## 2020-07-05 NOTE — ED PROVIDER NOTES
Encounter Date: 7/5/2020       History     Chief Complaint   Patient presents with    Nephrolithiasis     Patient seen here on 7/3/2020 fot kidney stone, complaining of pain, N&V.     40-year-old female with a history of kidney stones in the past comes complaining of left flank pain.  Patient was seen here 2 days ago with these complaints and underwent a full exam including CT which showed a 4.5 cm stone in the left ureter with mild hydroureter and hydronephrosis.  Patient was discharged with hydrocodone, Toradol, Flomax, and Zofran.  She was supposed to make an appointment with Dr. Polanco, the patient states that when she got home from the ER she fell asleep and by the time she woke up it was too late call for an appointment.  She states she is still having pain in the right flank and she is now of hydrocodone.  She also reports decreased urine output.  No fever or chills.  She has not noted any hematuria.        Review of patient's allergies indicates:   Allergen Reactions    Penicillins Hives    Tramadol Rash     Past Medical History:   Diagnosis Date    Anxiety     Asthma     Bipolar 1 disorder     Bipolar 2 disorder     Chronic back pain     Depression     Kidney stone     PTSD (post-traumatic stress disorder)     Spinal stenosis      Past Surgical History:   Procedure Laterality Date    HYSTERECTOMY      KNEE SURGERY      TOTAL ABDOMINAL HYSTERECTOMY W/ BILATERAL SALPINGOOPHORECTOMY       History reviewed. No pertinent family history.  Social History     Tobacco Use    Smoking status: Current Every Day Smoker     Packs/day: 0.50     Types: Cigarettes   Substance Use Topics    Alcohol use: No    Drug use: No     Review of Systems   Constitutional: Negative for chills and fever.   HENT: Negative for congestion, rhinorrhea and sore throat.    Eyes: Negative for photophobia and visual disturbance.   Respiratory: Negative for cough, chest tightness and shortness of breath.    Cardiovascular:  Negative for chest pain, palpitations and leg swelling.   Gastrointestinal: Negative for constipation, diarrhea, nausea and vomiting.        She complains of left flank pain which radiates into the left side of the abdomen and the left groin.   Endocrine: Negative for polyphagia and polyuria.   Genitourinary: Positive for decreased urine volume and flank pain. Negative for difficulty urinating, dysuria, enuresis and hematuria.   Musculoskeletal: Negative for back pain, myalgias, neck pain and neck stiffness.   Skin: Negative for pallor and rash.   Neurological: Negative for syncope, weakness, numbness and headaches.   Psychiatric/Behavioral: Negative for agitation, decreased concentration and hallucinations.       Physical Exam     Initial Vitals [07/05/20 1749]   BP Pulse Resp Temp SpO2   (!) 178/100 100 20 98.3 °F (36.8 °C) 97 %      MAP       --         Physical Exam    Nursing note and vitals reviewed.  Constitutional: She appears well-developed and well-nourished. No distress.   HENT:   Head: Normocephalic and atraumatic.   Nose: Nose normal.   Mouth/Throat: No oropharyngeal exudate.   Eyes: Conjunctivae and EOM are normal. Pupils are equal, round, and reactive to light. No scleral icterus.   Neck: Normal range of motion. Neck supple. No tracheal deviation present. No JVD present.   Cardiovascular: Normal rate, regular rhythm and normal heart sounds. Exam reveals no friction rub.    No murmur heard.  Pulmonary/Chest: Breath sounds normal. No respiratory distress. She has no wheezes.   Abdominal: Soft. Bowel sounds are normal. She exhibits no distension and no mass. There is no abdominal tenderness. There is no rebound and no guarding.   Musculoskeletal: Normal range of motion. No tenderness or edema.   Neurological: She is alert and oriented to person, place, and time. She has normal strength and normal reflexes. GCS score is 15. GCS eye subscore is 4. GCS verbal subscore is 5. GCS motor subscore is 6.   Skin:  Skin is warm and dry. Capillary refill takes less than 2 seconds. No rash noted. No pallor.   Psychiatric: She has a normal mood and affect. Her behavior is normal.         ED Course   Procedures  Labs Reviewed - No data to display       Imaging Results    None          Medical Decision Making:   Differential Diagnosis:   Ureteral calculus, hydronephrosis, dehydration, UTI, etc.  ED Management:  Labs were repeated today.  No signs of UTI or other infectious process.  BUN and creatinine are fine.  Urine is clean except for slight bit of hemoglobin which is to be expected.  Here in the emergency department, her pain was treated and she states she is feeling much better.  She has received 1 L normal saline IV.  She states that she has used all of her hydrocodone and all of her Flomax so I will refill these prescriptions.  I do not believe that she needs hospitalization at this time.  She will be discharged home and she is instructed to continue her medications as prescribed and to call Dr. Polanco his office 1st thing in the morning for an appointment.  She will return here if she is worse in any way.  She expresses understanding of her instructions and she is satisfied with plan of care.                   ED Course as of Jul 05 1817   Sun Jul 05, 2020 1751 C/o L abd pain; seen here Thursday d/w L UVJ ureteral stone    I have performed the rapid medical exam (RME) portion of the medical screening exam (MSE) & have determined that this patient requires further evaluation and/or testing to determine if an emergent medical condition exists     [DH]      ED Course User Index  [DH] Kristofer Miller NP                Clinical Impression:       ICD-10-CM ICD-9-CM   1. Left ureteral stone  N20.1 592.1   2. Calculus of ureterovesical junction (UVJ)  N20.1 592.1                                Arnel Ramos MD  07/05/20 1943

## 2020-07-06 NOTE — DISCHARGE INSTRUCTIONS
Take medications as prescribed.  Do not drive or drink alcohol after taking Norco.  Follow-up with your primary care provider and with Urology 1st thing tomorrow.  Return here as needed or if worse in any way.

## 2020-07-23 ENCOUNTER — TELEPHONE (OUTPATIENT)
Dept: UROLOGY | Facility: CLINIC | Age: 40
End: 2020-07-23

## 2020-07-23 NOTE — TELEPHONE ENCOUNTER
Called patient to inform that provider will not be in [place] 8/3 and to offer appt in [place] on 8/3 or reschedule when provider will be in [place]. No answer. LMOM to give the office a call back.

## 2020-08-23 ENCOUNTER — TELEPHONE (OUTPATIENT)
Dept: UROLOGY | Facility: CLINIC | Age: 40
End: 2020-08-23

## 2020-10-05 ENCOUNTER — HOSPITAL ENCOUNTER (EMERGENCY)
Facility: HOSPITAL | Age: 40
Discharge: HOME | End: 2020-10-05
Attending: EMERGENCY MEDICINE

## 2020-10-05 VITALS
HEART RATE: 94 BPM | SYSTOLIC BLOOD PRESSURE: 121 MMHG | OXYGEN SATURATION: 100 % | TEMPERATURE: 98 F | RESPIRATION RATE: 20 BRPM | WEIGHT: 150 LBS | HEIGHT: 63 IN | BODY MASS INDEX: 26.58 KG/M2 | DIASTOLIC BLOOD PRESSURE: 97 MMHG

## 2020-10-05 DIAGNOSIS — S52.501A CLOSED FRACTURE OF DISTAL END OF RIGHT RADIUS, UNSPECIFIED FRACTURE MORPHOLOGY, INITIAL ENCOUNTER: Primary | ICD-10-CM

## 2020-10-05 DIAGNOSIS — M25.531 RIGHT WRIST PAIN: ICD-10-CM

## 2020-10-05 PROCEDURE — 29105 APPLICATION LONG ARM SPLINT: CPT | Mod: RT

## 2020-10-05 PROCEDURE — 73110 XR WRIST COMPLETE 3 VIEWS RIGHT: ICD-10-PCS | Mod: 26,RT,, | Performed by: RADIOLOGY

## 2020-10-05 PROCEDURE — 99283 EMERGENCY DEPT VISIT LOW MDM: CPT | Mod: 25

## 2020-10-05 PROCEDURE — 73110 X-RAY EXAM OF WRIST: CPT | Mod: 26,RT,, | Performed by: RADIOLOGY

## 2020-10-05 PROCEDURE — 73110 X-RAY EXAM OF WRIST: CPT | Mod: TC,FY,RT

## 2020-10-05 RX ORDER — IBUPROFEN 600 MG/1
600 TABLET ORAL 3 TIMES DAILY
Qty: 21 TABLET | Refills: 0 | Status: SHIPPED | OUTPATIENT
Start: 2020-10-05 | End: 2020-10-12

## 2020-10-06 NOTE — DISCHARGE INSTRUCTIONS
Take the medications as prescribed. Return for any worsening or new symptoms. Follow up with Orthopedic in 1 week for further evaluation management of distal radius fracture.

## 2020-10-06 NOTE — ED PROVIDER NOTES
Please note that my documentation in this Electronic Healthcare Record was produced using speech recognition software and therefore may contain errors related to that software.These could include grammar, punctuation and spelling errors or the inclusion/ exclusion of phrases that were not intended. Please contact myself for any clarification, questions or concerns.    HPI: Patient is a 40 y.o. female who presents with the chief complaint of right wrist pain after fall that occurred today.  She is right-hand dominant.  Patient states that she fell onto her right wrist.  Has since been having pain and swelling of the right wrist.  Denies any extremity weakness or paresthesias.  No other trauma or injury.  Has a history of anxiety, bipolar disorder, chronic back pain, PTSD, spinal stenosis.  Does not take any medication regularly.  Currently denying any head trauma, loss of consciousness, chest pain, difficulty breathing.    REVIEW OF SYSTEMS - 10 systems were independently reviewed and are otherwise negative with the exception of those items previously documented in the HPI and nursing notes.    Allergy: Penicillins and Tramadol    Past medical history:   Past Medical History:   Diagnosis Date    Anxiety     Asthma     Bipolar 1 disorder     Bipolar 2 disorder     Chronic back pain     Depression     Kidney stone     PTSD (post-traumatic stress disorder)     Spinal stenosis        Surgical History:   Past Surgical History:   Procedure Laterality Date    HYSTERECTOMY      KNEE SURGERY      TOTAL ABDOMINAL HYSTERECTOMY W/ BILATERAL SALPINGOOPHORECTOMY         Social history:   Social History     Socioeconomic History    Marital status:      Spouse name: Not on file    Number of children: Not on file    Years of education: Not on file    Highest education level: Not on file   Occupational History    Not on file   Social Needs    Financial resource strain: Not on file    Food insecurity      "Worry: Not on file     Inability: Not on file    Transportation needs     Medical: Not on file     Non-medical: Not on file   Tobacco Use    Smoking status: Current Every Day Smoker     Packs/day: 0.50     Types: Cigarettes   Substance and Sexual Activity    Alcohol use: Yes     Comment: occ    Drug use: No    Sexual activity: Yes     Birth control/protection: See Surgical Hx   Lifestyle    Physical activity     Days per week: Not on file     Minutes per session: Not on file    Stress: Not on file   Relationships    Social connections     Talks on phone: Not on file     Gets together: Not on file     Attends Restorationism service: Not on file     Active member of club or organization: Not on file     Attends meetings of clubs or organizations: Not on file     Relationship status: Not on file   Other Topics Concern    Not on file   Social History Narrative    Not on file       Family history: non-contributory    EHR: reviewed    Vitals: BP (!) 121/97 (BP Location: Left arm, Patient Position: Sitting)   Pulse 94   Temp 98.3 °F (36.8 °C) (Oral)   Resp 20   Ht 5' 3" (1.6 m)   Wt 68 kg (150 lb)   SpO2 100%   Breastfeeding No   BMI 26.57 kg/m²     PHYSICAL EXAM:    General-39 yo female awake and alert, oriented, GCS 15, in no acute distress,  HEENT- normocephalic, atraumatic, sclera anicteric, moist mucous membranes, PERRL, EOMI  CARDIOVASCULAR- regular rate and rhythm  PULMONARY- nonlabored, no respiratory distress  NEUROLOGIC- mental status normal, speech fluid, cognition normal, CN II-XII grossly intact, sensations equal normal bilateral upper and lower extremities, peripheral pulse 2 +/4, ambulatory with proper gait.  MUSCULOSKELETAL- well-nourished, well-developed, right wrist ttp with swelling over the distal radius. Neuro intact, range of motion of elbow joint.  Decreased range of motion of the right wrist joint.  Cap refill less than 3 sec.  DERMATOLOGIC- warm and dry, no visible rashes  PSYCHIATRIC- " normal affect, normal concentration           Labs Reviewed - No data to display    X-Ray Wrist Complete Right   Final Result      Nondisplaced comminuted transverse articular fracture involving the distal radial metadiaphysis.         Electronically signed by: Lukas Matias   Date:    10/06/2020   Time:    07:35          MEDICAL DECISION MAKING: Patient is a 40 y.o. female who presented with chief complaint of right wrist pain and swelling X today.  Patient states she fell onto the right wrist.  Denies any other traumatic injuries.  Denies any extremity weakness or paresthesias.  She is right-hand dominant.  Examination, she does have some tenderness and swelling over the right distal radius.  Good peripheral pulse, sensation, cap refill, and neurovascular intact.  X-ray was interpreted by me as nondisplaced distal radial fracture.  She is placed in a long-arm splint by HANNA Alamo.  She is neurovascular intact following the application of splint.  She will follow up with Orthopedic in 1 week for further evaluation management.  I initially gave her ibuprofen but states this upsets her stomach.  Then advised to take Tylenol as needed.  She will return if she develops any worsening symptoms.  She will be discharged home in stable condition.    ADDENDUM: 10/6/2020 1020 hrs x-ray was read by radiologist as nondisplaced comminuted transverse articular fracture involving the distal radial metadiaphysis patient is advised last evening she did have a distal radial fracture and was placed in a long-arm splint and given referral for orthopedic in 1 week.  Patient did verbalize her understanding and agreed to that plan.  I still believe this is a reasonable plan and the patient should follow up within 1 week.      CLINICAL IMPRESSION:  1. Closed fracture of distal end of right radius, unspecified fracture morphology, initial encounter    2. Right wrist pain         KIMBERLYN Mobley  10/05/20 Zeeshan Romero  KIMBERLYN Diaz  10/06/20 1020

## 2020-10-13 ENCOUNTER — HOSPITAL ENCOUNTER (EMERGENCY)
Facility: HOSPITAL | Age: 40
Discharge: HOME | End: 2020-10-13
Attending: EMERGENCY MEDICINE

## 2020-10-13 VITALS
BODY MASS INDEX: 26.58 KG/M2 | DIASTOLIC BLOOD PRESSURE: 91 MMHG | RESPIRATION RATE: 18 BRPM | SYSTOLIC BLOOD PRESSURE: 124 MMHG | TEMPERATURE: 99 F | OXYGEN SATURATION: 99 % | HEART RATE: 85 BPM | WEIGHT: 150 LBS | HEIGHT: 63 IN

## 2020-10-13 DIAGNOSIS — Z48.00 ENCOUNTER FOR CHANGE OF DRESSING: Primary | ICD-10-CM

## 2020-10-13 PROCEDURE — 99282 EMERGENCY DEPT VISIT SF MDM: CPT

## 2020-10-13 NOTE — ED TRIAGE NOTES
Pt has ocl right arm, she is wanting a dressing change b/c the ace wrap is so dirty.  She has appt with ortho on the 29th

## 2020-10-13 NOTE — DISCHARGE INSTRUCTIONS
Download the [employer] luisa to access your health records & test results.  Please remember that you had a visit to the emergency room today and this does not substitute as primary care services for ongoing management because emergency services is a snap shot in time.  Should you have any worsening condition that requires emergency services do not hesitate to return to the ER.    COVID-19 TESTING  Hot Line [phone]  [address] [place] [address], [place] [address]  [facility]  Hours: 8am-5pm Monday - Friday   8am-noon Saturday - Sunday

## 2020-10-13 NOTE — ED PROVIDER NOTES
Encounter Date: 10/13/2020       History     Chief Complaint   Patient presents with    OCL change     39yo female presents to ER for requesting R FA splint ACE bandage change; seen here 10/5 (note reviewed) s/p injury d/w distal radius fx, has pending apt w/ Dr. Ortega on 10/29    Denies:  New injury/trauma    Past medical/surgical history, allergies & current medications reviewed with patient -- hysterectomy    Known SARS-CoV2 exposure:  No  Room:      The history is provided by the patient. No  was used.     Review of patient's allergies indicates:   Allergen Reactions    Penicillins Hives    Tramadol Rash     Past Medical History:   Diagnosis Date    Anxiety     Asthma     Bipolar 1 disorder     Bipolar 2 disorder     Chronic back pain     Depression     Kidney stone     PTSD (post-traumatic stress disorder)     Spinal stenosis      Past Surgical History:   Procedure Laterality Date    HYSTERECTOMY      KNEE SURGERY      TOTAL ABDOMINAL HYSTERECTOMY W/ BILATERAL SALPINGOOPHORECTOMY       History reviewed. No pertinent family history.  Social History     Tobacco Use    Smoking status: Current Every Day Smoker     Packs/day: 0.50     Types: Cigarettes   Substance Use Topics    Alcohol use: Yes     Comment: occ    Drug use: No     Review of Systems   Constitutional: Negative for fever.   HENT: Negative for sore throat.    Respiratory: Negative for shortness of breath.    Cardiovascular: Negative for chest pain.   Gastrointestinal: Negative for nausea.   Genitourinary: Negative for dysuria.   Musculoskeletal: Negative for back pain.   Skin: Negative for rash.   Neurological: Negative for weakness.   Hematological: Does not bruise/bleed easily.   All other systems reviewed and are negative.      Physical Exam     Initial Vitals [10/13/20 1602]   BP Pulse Resp Temp SpO2   (!) 124/91 85 18 98.5 °F (36.9 °C) 99 %      MAP       --         Physical Exam    Nursing note and vitals  reviewed.  Constitutional: She appears well-developed. She does not appear ill. No distress.   AF, VSS   HENT:   Head: Normocephalic and atraumatic.   Right Ear: External ear normal.   Left Ear: External ear normal.   Nose: Nose normal.   Eyes: Lids are normal.   Neck: Neck supple.   Cardiovascular: Normal rate.   Pulmonary/Chest: Effort normal. No respiratory distress.   Abdominal: She exhibits no distension.   Neurological: She is alert.   Skin: Skin is warm. Capillary refill takes less than 2 seconds. No rash noted.   Psychiatric: She has a normal mood and affect.         ED Course   Procedures  Labs Reviewed - No data to display       Imaging Results    None          Medical Decision Making:   ED Management:  Ace wrap dressing change successfully    Findings, diagnosis & plan of care discussed with patient:  Encounter for dressing change; care instructions given -- patient encouraged to attend her appointment with Dr. Ortega on 10/29 as scheduled    All questions answered, strict return precautions given, patient verbalized understanding to all instructions, pleasant visit -- vital signs are stable & patient is in no distress at discharge    Disclaimer:  This note was prepared with [employer] voice recognition transcription software. Garbled syntax, mangled pronouns, and other bizarre constructions may be attributed to that software system.  Should there be any questions do not hesitate to contact me for clarification.                               Clinical Impression:     ICD-10-CM ICD-9-CM   1. Encounter for change of dressing  Z48.00 V58.30                          ED Disposition Condition    Discharge Stable        ED Prescriptions     None        Follow-up Information     Follow up With Specialties Details Why Contact Info    Gilda Sidhu III, MD Internal Medicine, Cardiology Go to  For any pain management concerns [facility] [address]  [place] [facility] [address] [phone]        [facility] / orthopedics  Go to  On 10/29 as scheduled                                        Kristofer Miller, NP  10/13/20 3153

## 2020-10-28 DIAGNOSIS — M25.531 RIGHT WRIST PAIN: Primary | ICD-10-CM

## 2020-11-03 ENCOUNTER — TELEPHONE (OUTPATIENT)
Dept: ORTHOPEDICS | Facility: CLINIC | Age: 40
End: 2020-11-03

## 2020-11-03 NOTE — TELEPHONE ENCOUNTER
----- Message from Nisha Armendariz sent at 11/3/2020 11:45 AM CST -----  Type:  Sooner Apoointment Request    Caller is requesting a sooner appointment.  Caller declined first available appointment listed below.  Caller will not accept being placed on the waitlist and is requesting a message be sent to doctor.    Name of Caller:  Patient   When is the first available appointment?  Scheduled for 11/13  Symptoms:  Broken Wrist, swelling   Best Call Back Number:     Additional Information:  Please advise-thank you

## 2020-11-10 ENCOUNTER — HOSPITAL ENCOUNTER (OUTPATIENT)
Dept: RADIOLOGY | Facility: HOSPITAL | Age: 40
Discharge: HOME | End: 2020-11-10
Attending: ORTHOPAEDIC SURGERY

## 2020-11-10 ENCOUNTER — OFFICE VISIT (OUTPATIENT)
Dept: ORTHOPEDICS | Facility: CLINIC | Age: 40
End: 2020-11-10

## 2020-11-10 VITALS
WEIGHT: 150 LBS | TEMPERATURE: 98 F | RESPIRATION RATE: 17 BRPM | HEIGHT: 63 IN | HEART RATE: 110 BPM | OXYGEN SATURATION: 99 % | BODY MASS INDEX: 26.58 KG/M2

## 2020-11-10 DIAGNOSIS — S52.601A CLOSED FRACTURE DISTAL RADIUS AND ULNA, RIGHT, INITIAL ENCOUNTER: Primary | ICD-10-CM

## 2020-11-10 DIAGNOSIS — M25.531 RIGHT WRIST PAIN: ICD-10-CM

## 2020-11-10 DIAGNOSIS — S52.501A CLOSED FRACTURE DISTAL RADIUS AND ULNA, RIGHT, INITIAL ENCOUNTER: Primary | ICD-10-CM

## 2020-11-10 PROCEDURE — 73110 XR WRIST COMPLETE 3 VIEWS RIGHT: ICD-10-PCS | Mod: 26,RT,, | Performed by: RADIOLOGY

## 2020-11-10 PROCEDURE — 29075 APPL CST ELBW FNGR SHORT ARM: CPT | Mod: PBBFAC,PN | Performed by: ORTHOPAEDIC SURGERY

## 2020-11-10 PROCEDURE — 29075 APPL CST ELBW FNGR SHORT ARM: CPT | Mod: S$PBB,RT,, | Performed by: ORTHOPAEDIC SURGERY

## 2020-11-10 PROCEDURE — 73110 X-RAY EXAM OF WRIST: CPT | Mod: 26,RT,, | Performed by: RADIOLOGY

## 2020-11-10 PROCEDURE — 29075 PR APPLY FOREARM CAST: ICD-10-PCS | Mod: S$PBB,RT,, | Performed by: ORTHOPAEDIC SURGERY

## 2020-11-10 PROCEDURE — 99213 OFFICE O/P EST LOW 20 MIN: CPT | Mod: PBBFAC,25,PN | Performed by: ORTHOPAEDIC SURGERY

## 2020-11-10 PROCEDURE — 99999 PR PBB SHADOW E&M-EST. PATIENT-LVL III: ICD-10-PCS | Mod: PBBFAC,,, | Performed by: ORTHOPAEDIC SURGERY

## 2020-11-10 PROCEDURE — 99999 PR PBB SHADOW E&M-EST. PATIENT-LVL III: CPT | Mod: PBBFAC,,, | Performed by: ORTHOPAEDIC SURGERY

## 2020-11-10 PROCEDURE — 73110 X-RAY EXAM OF WRIST: CPT | Mod: TC,PN,RT

## 2020-11-10 PROCEDURE — 99214 PR OFFICE/OUTPT VISIT, EST, LEVL IV, 30-39 MIN: ICD-10-PCS | Mod: S$PBB,25,, | Performed by: ORTHOPAEDIC SURGERY

## 2020-11-10 PROCEDURE — 99214 OFFICE O/P EST MOD 30 MIN: CPT | Mod: S$PBB,25,, | Performed by: ORTHOPAEDIC SURGERY

## 2020-11-10 RX ORDER — CYCLOBENZAPRINE HCL 10 MG
TABLET ORAL
COMMUNITY

## 2020-11-10 RX ORDER — MELOXICAM 15 MG/1
TABLET ORAL
COMMUNITY

## 2020-11-11 NOTE — PROGRESS NOTES
Subjective:      Patient ID: Sarah Artis is a 40 y.o. female.    Chief Complaint: Injury and Pain of the Right Wrist      HPI:   returns today with new complaints of 5 weeks of right wrist pain which began on 10/05/2020 after she tripped over a branch and fell onto an outstretched hand.  She was seen the emergency room on her date of injury, after x-rays were taken which showed a wrist fracture, she was placed in a splint.  She states she has gotten some intermittent numbness and tingling.  Moving her wrist increases her pain.    ROS:  No new diagnosis/surgery/prescriptions since last office visit on 11/05/2019.  Constitution: Negative for chills and fever.   HENT: Negative for congestion.    Eyes: Negative for blurred vision.   Cardiovascular: Negative for chest pain.   Respiratory: Negative for cough.    Endocrine: Negative for polydipsia.   Hematologic/Lymphatic: Negative for adenopathy.   Skin: Negative for flushing and itching.   Musculoskeletal: Positive for joint pain. Negative for gout.   Gastrointestinal: Negative for constipation, diarrhea and heartburn.   Genitourinary: Negative for nocturia.   Neurological: Positive for headaches. Negative for seizures.   Psychiatric/Behavioral: Positive for depression. Negative for substance abuse. The patient is nervous/anxious.    Allergic/Immunologic: Negative for environmental allergies.       Objective:      Physical Exam:   General: AAOx3.  No acute distress  Vascular:  Pulses intact and equal bilaterally.  Capillary refill less than 3 seconds and equal bilaterally  Neurologic:  Pinprick and soft touch intact and equal bilaterally  Integment:  No ecchymosis, no errythema  Extremity:  Wrist:  Splint in fair condition.  With splint removed:  Pronation/supination right wrist, 65/65 degrees, left wrist 90/85 degrees.  Dorsiflexion/volar flexion right wrist 60/60 degrees, left wrist 75/70 degrees.  Mild tenderness with motion right wrist.   Nontender with palpation anatomic snuffbox.  Nontender at the scapholunate interval.  Rodriguez's test negative bilaterally.  Nontender at the DRUJ/TFCC bilaterally.  On impaction test negative bilaterally.  Tender with palpation distal radius, right wrist.  Full motion the patient's fingers with minimal pain.  Wartenberg sign negative bilaterally  Radiography:  Personally reviewed x-rays of the right wrist completed on 11/10/2020 showed a minimally displaced apex dorsal minimal angulated fracture of the distal radius with callus without intra-articular extension.  There is some mild increased apex dorsal angulation when compared to x-rays from 10/05/2020.        Assessment:       Impression:  Healing minimally displaced distal radius fracture, right wrist.      Plan:       1.  Discussed physical examination and radiographic findings with the patient. Sarah understands that she has minimally displaced distal radius fracture of her right wrist.  Discussed in detail with the patient that had she presented to the office 6 weeks ago when she initially injured her wrist she could have proceed with surgery to ensure anatomic alignment.  At this point she is healing her fracture initial at the septum minor displacement although the most likely will not cause her functional incapacity.  2.  Place in a short-arm well-molded fiberglass cast.  3.  Cast care instructions were discussed and given to the patient.  4.  Keep cast clean and dry.  5.  One-handed activities with the left hand only no lifting/pushing/pulling/climbing requiring the use of the right hand.  6.  Follow up in 1 month with an x-ray out of plaster right wrist expect to transition the patient into a removable off-the-shelf cock-up splint at that time.

## 2023-01-04 ENCOUNTER — HOSPITAL ENCOUNTER (EMERGENCY)
Facility: HOSPITAL | Age: 43
Discharge: HOME | End: 2023-01-04

## 2023-01-04 VITALS
HEART RATE: 89 BPM | OXYGEN SATURATION: 99 % | TEMPERATURE: 98 F | SYSTOLIC BLOOD PRESSURE: 150 MMHG | DIASTOLIC BLOOD PRESSURE: 111 MMHG | RESPIRATION RATE: 20 BRPM | HEIGHT: 63 IN | WEIGHT: 150 LBS | BODY MASS INDEX: 26.58 KG/M2

## 2023-01-04 DIAGNOSIS — F41.9 ANXIETY: ICD-10-CM

## 2023-01-04 DIAGNOSIS — K21.9 GASTROESOPHAGEAL REFLUX DISEASE, UNSPECIFIED WHETHER ESOPHAGITIS PRESENT: Primary | ICD-10-CM

## 2023-01-04 DIAGNOSIS — R10.13 EPIGASTRIC PAIN: ICD-10-CM

## 2023-01-04 DIAGNOSIS — R07.89 CHEST TIGHTNESS: ICD-10-CM

## 2023-01-04 LAB
ALBUMIN SERPL BCP-MCNC: 3.6 G/DL (ref 3.5–5.2)
ALP SERPL-CCNC: 111 U/L (ref 55–135)
ALT SERPL W/O P-5'-P-CCNC: 19 U/L (ref 10–44)
ANION GAP SERPL CALC-SCNC: 9 MMOL/L (ref 8–16)
AST SERPL-CCNC: 17 U/L (ref 10–40)
BASOPHILS # BLD AUTO: 0.09 K/UL (ref 0–0.2)
BASOPHILS NFR BLD: 0.9 % (ref 0–1.9)
BILIRUB SERPL-MCNC: 0.1 MG/DL (ref 0.1–1)
BUN SERPL-MCNC: 11 MG/DL (ref 6–20)
CALCIUM SERPL-MCNC: 9 MG/DL (ref 8.7–10.5)
CHLORIDE SERPL-SCNC: 102 MMOL/L (ref 95–110)
CO2 SERPL-SCNC: 27 MMOL/L (ref 23–29)
CREAT SERPL-MCNC: 0.7 MG/DL (ref 0.5–1.4)
DIFFERENTIAL METHOD: ABNORMAL
EOSINOPHIL # BLD AUTO: 0.9 K/UL (ref 0–0.5)
EOSINOPHIL NFR BLD: 9 % (ref 0–8)
ERYTHROCYTE [DISTWIDTH] IN BLOOD BY AUTOMATED COUNT: 12.5 % (ref 11.5–14.5)
EST. GFR  (NO RACE VARIABLE): >60 ML/MIN/1.73 M^2
GLUCOSE SERPL-MCNC: 123 MG/DL (ref 70–110)
HCT VFR BLD AUTO: 40.9 % (ref 37–48.5)
HGB BLD-MCNC: 13.3 G/DL (ref 12–16)
IMM GRANULOCYTES # BLD AUTO: 0.02 K/UL (ref 0–0.04)
IMM GRANULOCYTES NFR BLD AUTO: 0.2 % (ref 0–0.5)
LYMPHOCYTES # BLD AUTO: 1.5 K/UL (ref 1–4.8)
LYMPHOCYTES NFR BLD: 14.9 % (ref 18–48)
MCH RBC QN AUTO: 27.4 PG (ref 27–31)
MCHC RBC AUTO-ENTMCNC: 32.5 G/DL (ref 32–36)
MCV RBC AUTO: 84 FL (ref 82–98)
MONOCYTES # BLD AUTO: 0.5 K/UL (ref 0.3–1)
MONOCYTES NFR BLD: 4.9 % (ref 4–15)
NEUTROPHILS # BLD AUTO: 7.1 K/UL (ref 1.8–7.7)
NEUTROPHILS NFR BLD: 70.1 % (ref 38–73)
NRBC BLD-RTO: 0 /100 WBC
PLATELET # BLD AUTO: 360 K/UL (ref 150–450)
PMV BLD AUTO: 8.7 FL (ref 9.2–12.9)
POTASSIUM SERPL-SCNC: 3.5 MMOL/L (ref 3.5–5.1)
PROT SERPL-MCNC: 7 G/DL (ref 6–8.4)
RBC # BLD AUTO: 4.85 M/UL (ref 4–5.4)
SODIUM SERPL-SCNC: 138 MMOL/L (ref 136–145)
TROPONIN I SERPL DL<=0.01 NG/ML-MCNC: <0.006 NG/ML (ref 0–0.03)
WBC # BLD AUTO: 10.1 K/UL (ref 3.9–12.7)

## 2023-01-04 PROCEDURE — 93010 ELECTROCARDIOGRAM REPORT: CPT | Mod: ,,, | Performed by: INTERNAL MEDICINE

## 2023-01-04 PROCEDURE — 99285 EMERGENCY DEPT VISIT HI MDM: CPT | Mod: 25

## 2023-01-04 PROCEDURE — 80053 COMPREHEN METABOLIC PANEL: CPT | Performed by: NURSE PRACTITIONER

## 2023-01-04 PROCEDURE — 71045 XR CHEST AP PORTABLE: ICD-10-PCS | Mod: 26,,, | Performed by: RADIOLOGY

## 2023-01-04 PROCEDURE — 84484 ASSAY OF TROPONIN QUANT: CPT | Performed by: NURSE PRACTITIONER

## 2023-01-04 PROCEDURE — 85025 COMPLETE CBC W/AUTO DIFF WBC: CPT | Performed by: NURSE PRACTITIONER

## 2023-01-04 PROCEDURE — 25000003 PHARM REV CODE 250: Performed by: NURSE PRACTITIONER

## 2023-01-04 PROCEDURE — 71045 X-RAY EXAM CHEST 1 VIEW: CPT | Mod: 26,,, | Performed by: RADIOLOGY

## 2023-01-04 PROCEDURE — 93010 EKG 12-LEAD: ICD-10-PCS | Mod: ,,, | Performed by: INTERNAL MEDICINE

## 2023-01-04 PROCEDURE — 93005 ELECTROCARDIOGRAM TRACING: CPT

## 2023-01-04 PROCEDURE — 71045 X-RAY EXAM CHEST 1 VIEW: CPT | Mod: TC

## 2023-01-04 RX ORDER — HYDROXYZINE PAMOATE 25 MG/1
25 CAPSULE ORAL EVERY 6 HOURS PRN
Qty: 30 CAPSULE | Refills: 0 | Status: SHIPPED | OUTPATIENT
Start: 2023-01-04

## 2023-01-04 RX ORDER — MAG HYDROX/ALUMINUM HYD/SIMETH 200-200-20
30 SUSPENSION, ORAL (FINAL DOSE FORM) ORAL ONCE
Status: COMPLETED | OUTPATIENT
Start: 2023-01-04 | End: 2023-01-04

## 2023-01-04 RX ORDER — OMEPRAZOLE 20 MG/1
20 CAPSULE, DELAYED RELEASE ORAL DAILY
Qty: 30 CAPSULE | Refills: 0 | Status: SHIPPED | OUTPATIENT
Start: 2023-01-04 | End: 2024-01-04

## 2023-01-04 RX ORDER — LIDOCAINE HYDROCHLORIDE 20 MG/ML
15 SOLUTION OROPHARYNGEAL ONCE
Status: COMPLETED | OUTPATIENT
Start: 2023-01-04 | End: 2023-01-04

## 2023-01-04 RX ADMIN — LIDOCAINE HYDROCHLORIDE 15 ML: 20 SOLUTION ORAL at 05:01

## 2023-01-04 RX ADMIN — ALUMINUM HYDROXIDE, MAGNESIUM HYDROXIDE, AND SIMETHICONE 30 ML: 200; 200; 20 SUSPENSION ORAL at 05:01

## 2023-01-04 NOTE — Clinical Note
"Sarah Sharma" Chandra was seen and treated in our emergency department on 1/4/2023.  She may return to work on 01/05/2023.       If you have any questions or concerns, please don't hesitate to call.      Jaxson Stewart NP"

## 2023-01-04 NOTE — ED PROVIDER NOTES
"Encounter Date: 1/4/2023       History     Chief Complaint   Patient presents with    Abdominal Pain     Upper abdominal pain worse with inspiration. States chest pressure onset this morning. Patient states, "it feels like an elephant sitting on my chest." nausea     Patient is a 42-year-old female presents emergency room with chest tightness that started earlier this morning.  Patient states she has been coughing that started last night, she denies any fevers, chills, vomiting or diarrhea.  Patient states she has been nauseated and dry heaving.  Patient states she does have a history of depression and anxiety in the past.  Patient denies any runny nose, sore throat, productive cough.  Patient states she does vape.  Patient does not take any medications on daily basis.  She denies any abdominal pain, dysuria discharge.  Patient states the chest tightness in his has in her lower chest and epigastric area.  Nothing has made pain better or worse throughout the day.    Review of patient's allergies indicates:   Allergen Reactions    Penicillins Hives    Tramadol Rash     Past Medical History:   Diagnosis Date    Anxiety     Asthma     Bipolar 1 disorder     Bipolar 2 disorder     Chronic back pain     Depression     Kidney stone     PTSD (post-traumatic stress disorder)     Spinal stenosis      Past Surgical History:   Procedure Laterality Date    HYSTERECTOMY      KNEE SURGERY      TOTAL ABDOMINAL HYSTERECTOMY W/ BILATERAL SALPINGOOPHORECTOMY       History reviewed. No pertinent family history.  Social History     Tobacco Use    Smoking status: Every Day     Packs/day: 0.50     Types: Vaping with nicotine, Cigarettes    Smokeless tobacco: Never   Substance Use Topics    Alcohol use: Not Currently     Comment: occ    Drug use: No     Review of Systems   Constitutional: Negative.    HENT: Negative.     Eyes: Negative.    Respiratory: Negative.  Chest tightness: described as tightness.    Cardiovascular:  Positive for " chest pain.   Endocrine: Negative.    Genitourinary: Negative.    Musculoskeletal: Negative.    Skin: Negative.    Allergic/Immunologic: Negative for food allergies.   Neurological: Negative.    Hematological: Negative.    Psychiatric/Behavioral: Negative.     All other systems reviewed and are negative.    Physical Exam     Initial Vitals [01/04/23 1718]   BP Pulse Resp Temp SpO2   (!) 150/111 89 20 98.4 °F (36.9 °C) 99 %      MAP       --         Physical Exam    Nursing note and vitals reviewed.  Constitutional: She appears well-developed and well-nourished. She is not diaphoretic. No distress.   HENT:   Head: Normocephalic and atraumatic.   Mouth/Throat: Oropharynx is clear and moist.   Eyes: Conjunctivae and EOM are normal. Pupils are equal, round, and reactive to light. No scleral icterus.   Neck:   Normal range of motion.  Cardiovascular:  Normal rate, regular rhythm, normal heart sounds and intact distal pulses.     Exam reveals no friction rub.       No murmur heard.  Pulmonary/Chest: Breath sounds normal. No respiratory distress. She has no wheezes. She has no rhonchi. She has no rales. She exhibits no tenderness.   Abdominal: Abdomen is soft and flat. Bowel sounds are normal. She exhibits no distension. There is no abdominal tenderness. No hernia.   No right CVA tenderness.  No left CVA tenderness. There is no rebound, no guarding and no tenderness at McBurney's point. negative obturator sign and negative psoas sign  Musculoskeletal:         General: No tenderness or edema. Normal range of motion.      Cervical back: Normal range of motion.     Lymphadenopathy:     She has no cervical adenopathy.   Neurological: She is alert and oriented to person, place, and time. She has normal strength. No sensory deficit. GCS score is 15. GCS eye subscore is 4. GCS verbal subscore is 5. GCS motor subscore is 6.   Skin: Skin is dry. Capillary refill takes 2 to 3 seconds.   Psychiatric: She has a normal mood and affect.        ED Course   Procedures  Labs Reviewed   CBC W/ AUTO DIFFERENTIAL - Abnormal; Notable for the following components:       Result Value    MPV 8.7 (*)     Eos # 0.9 (*)     Lymph % 14.9 (*)     Eosinophil % 9.0 (*)     All other components within normal limits   COMPREHENSIVE METABOLIC PANEL - Abnormal; Notable for the following components:    Glucose 123 (*)     All other components within normal limits   TROPONIN I     EKG Readings: (Independently Interpreted)   Initial Reading: No STEMI.   EKG performed at 5:32 p.m. reviewed by me.  Patient shows sinus rhythm, ventricular rate 76, AZ interval 122 milliseconds, QRS duration 78 milliseconds, normal axis deviation, no ST elevation or depression.  Patient does have what appears to be inverted T-waves in V1.  No other abnormalities.     Imaging Results              X-Ray Chest AP Portable (Final result)  Result time 01/04/23 18:03:07      Final result by Baltazar Joshi MD (01/04/23 18:03:07)                   Impression:      No acute cardiopulmonary process.      Electronically signed by: Baltazar Joshi  Date:    01/04/2023  Time:    18:03               Narrative:    EXAMINATION:  XR CHEST AP PORTABLE    CLINICAL HISTORY:  Other chest pain    TECHNIQUE:  Single frontal view of the chest was performed.    COMPARISON:  Shoulder radiographs 11/04/2019.    FINDINGS:  The lungs are well expanded and clear. No focal consolidation, pneumothorax, or pleural effusion. Cardiomediastinal silhouette is within normal limits.  Degenerative changes of the right shoulder as well as surgical anchors present within the humeral head.  No acute bony process appreciated.                                    X-Rays:   Independently Interpreted Readings:   Other Readings:  Chest x-ray    No acute abnormalities identified, do agree with radiologist's findings as noted below.      COMPARISON:  Shoulder radiographs 11/04/2019.     FINDINGS:  The lungs are well expanded and clear. No focal  consolidation, pneumothorax, or pleural effusion. Cardiomediastinal silhouette is within normal limits.    No acute bony process appreciated.     Impression:     No acute cardiopulmonary process.        Electronically signed by: Baltazar Joshi  Date:                                            01/04/2023  Time:                                           18:03  Medications   aluminum-magnesium hydroxide-simethicone 200-200-20 mg/5 mL suspension 30 mL (30 mLs Oral Given 1/4/23 1747)     And   LIDOcaine HCl 2% oral solution 15 mL (15 mLs Oral Given 1/4/23 1747)     Medical Decision Making:   Initial Assessment:   Patient seen examined emergency room.  She appears to be in no acute distress at this time.  Vital signs were stable.  Assessment as noted above.  Differential Diagnosis:   Gastritis, MI, PE, peptic ulcer, anxiety  Clinical Tests:   Lab Tests: Ordered and Reviewed  The following lab test(s) were unremarkable: CBC, CMP and Troponin  Radiological Study: Ordered and Reviewed  Medical Tests: Ordered and Reviewed  ED Management:  Patient seen examined emergency room.  EKG has been performed, and reviewed.  Chest x-ray, labs ordered.  Will give the patient GI cocktail.  Then re-evaluate.    Patient has had improvement with GI cocktail.  Labs reviewed.    Discussed findings with the patient at the bedside.  Patient does states pain has resolved.  Patient has no recollection of having any peptic ulcer issues in past.  Patient states she definite can spicy foods.  Will treat patient with Prilosec for possible reflux, also give the patient Vistaril for anxiety.  Encouraged patient follow-up primary care provider for further evaluation with for possible long-term medications for her depression and anxiety.  Return emergency room if symptoms return, worsen or develops any new other worrisome symptoms.                        Clinical Impression:   Final diagnoses:  [R10.13] Epigastric pain  [R07.89] Chest tightness  [K21.9]  Gastroesophageal reflux disease, unspecified whether esophagitis present (Primary)  [F41.9] Anxiety        ED Disposition Condition    Discharge Stable          ED Prescriptions       Medication Sig Dispense Start Date End Date Auth. Provider    omeprazole (PRILOSEC) 20 MG capsule Take 1 capsule (20 mg total) by mouth once daily. 30 capsule 1/4/2023 1/4/2024 Jaxson Stewart NP    hydrOXYzine pamoate (VISTARIL) 25 MG Cap Take 1 capsule (25 mg total) by mouth every 6 (six) hours as needed (Anxiety). 30 capsule 1/4/2023 -- Jaxson Stewart NP          Follow-up Information       Follow up With Specialties Details Why Contact Info    Gilda Sidhu III, MD Internal Medicine, Cardiology In 1 week If symptoms worsen, As needed [address]  [place] [facility] [place] [address] [phone]               Jaxson Stewart NP  01/04/23 [id number]

## 2023-01-05 NOTE — DISCHARGE INSTRUCTIONS
Take medication as prescribed.    Follow-up primary care provider next 1-2 weeks for possible discussion about long-term medication for anxiety depression.  As well as refills.    Return emergency room if symptoms worsen, you develop any new or other worrisome symptom.

## 2024-10-07 ENCOUNTER — HOSPITAL ENCOUNTER (EMERGENCY)
Facility: HOSPITAL | Age: 44
Discharge: HOME | End: 2024-10-07
Attending: EMERGENCY MEDICINE

## 2024-10-07 VITALS
HEIGHT: 63 IN | WEIGHT: 167.13 LBS | RESPIRATION RATE: 20 BRPM | TEMPERATURE: 98 F | OXYGEN SATURATION: 99 % | HEART RATE: 85 BPM | DIASTOLIC BLOOD PRESSURE: 80 MMHG | SYSTOLIC BLOOD PRESSURE: 125 MMHG | BODY MASS INDEX: 29.61 KG/M2

## 2024-10-07 DIAGNOSIS — S80.11XA CONTUSION OF RIGHT LOWER LEG, INITIAL ENCOUNTER: ICD-10-CM
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
## 2024-10-07 PROCEDURE — 99284 EMERGENCY DEPT VISIT MOD MDM: CPT | Mod: 25

## 2024-10-07 PROCEDURE — 96372 THER/PROPH/DIAG INJ SC/IM: CPT | Performed by: NURSE PRACTITIONER

## 2024-10-07 PROCEDURE — 63600175 PHARM REV CODE 636 W HCPCS: Performed by: NURSE PRACTITIONER

## 2024-10-07 RX ORDER — LISINOPRIL 20 MG/1
20 TABLET ORAL
COMMUNITY
Start: 2024-09-06

## 2024-10-07 RX ORDER — NAPROXEN 500 MG/1
500 TABLET ORAL 2 TIMES DAILY WITH MEALS
Qty: 20 TABLET | Refills: 0 | Status: SHIPPED | OUTPATIENT
Start: 2024-10-07 | End: 2024-10-17

## 2024-10-07 RX ORDER — NAPROXEN 500 MG/1
500 TABLET ORAL 2 TIMES DAILY PRN
COMMUNITY
Start: 2024-09-06 | End: 2024-10-07

## 2024-10-07 RX ORDER — ATORVASTATIN CALCIUM 10 MG/1
10 TABLET, FILM COATED ORAL
COMMUNITY
Start: 2024-09-06

## 2024-10-07 RX ORDER — MUPIROCIN 20 MG/G
OINTMENT TOPICAL 3 TIMES DAILY
Qty: 30 G | Refills: 0 | Status: SHIPPED | OUTPATIENT
Start: 2024-10-07

## 2024-10-07 RX ORDER — KETOROLAC TROMETHAMINE 30 MG/ML
60 INJECTION, SOLUTION INTRAMUSCULAR; INTRAVENOUS
Status: COMPLETED | OUTPATIENT
Start: 2024-10-07 | End: 2024-10-07

## 2024-10-07 RX ORDER — METHOCARBAMOL 500 MG/1
500 TABLET, FILM COATED ORAL 4 TIMES DAILY PRN
Qty: 30 TABLET | Refills: 0 | Status: SHIPPED | OUTPATIENT
Start: 2024-10-07

## 2024-10-07 RX ORDER — ORPHENADRINE CITRATE 30 MG/ML
60 INJECTION INTRAMUSCULAR; INTRAVENOUS
Status: COMPLETED | OUTPATIENT
Start: 2024-10-07 | End: 2024-10-07

## 2024-10-07 RX ADMIN — ORPHENADRINE CITRATE 60 MG: 30 INJECTION, SOLUTION INTRAMUSCULAR; INTRAVENOUS at 06:10

## 2024-10-07 RX ADMIN — KETOROLAC TROMETHAMINE 60 MG: 30 INJECTION, SOLUTION INTRAMUSCULAR at 06:10

## 2024-10-08 NOTE — ED PROVIDER NOTES
CHIEF COMPLAINT  Chief Complaint   Patient presents with    Fall     Fall of 3 step stool today at approx 1615 today. Denies LOC. C/o R leg and low back pain. Ambulatory into triage.       HISTORY OF PRESENT ILLNESS  Sarha Artis is a 44 y.o. female with PMH as below who presents to ER for evaluation of fall injury. Patient states she fell from 3 step step stool toward right right side today. There were some superficial abrasions, hematoma on right leg. Patient c/o lower back and right leg pain. She is ambulatory without difficulty.there was no step off deformity.   No other specific aggravating or relieving factors otherwise.      PAST MEDICAL HISTORY  Past Medical History:   Diagnosis Date    Anxiety     Asthma     Bipolar 1 disorder     Bipolar 2 disorder     Chronic back pain     Depression     Hypertension     Kidney stone     PTSD (post-traumatic stress disorder)     Spinal stenosis        CURRENT MEDICATIONS    No current facility-administered medications for this encounter.    Current Outpatient Medications:     atorvastatin (LIPITOR) 10 MG tablet, Take 10 mg by mouth., Disp: , Rfl:     lisinopriL (PRINIVIL,ZESTRIL) 20 MG tablet, Take 20 mg by mouth., Disp: , Rfl:     hydrOXYzine pamoate (VISTARIL) 25 MG Cap, Take 1 capsule (25 mg total) by mouth every 6 (six) hours as needed (Anxiety)., Disp: 30 capsule, Rfl: 0    methocarbamoL (ROBAXIN) 500 MG Tab, Take 1 tablet (500 mg total) by mouth 4 (four) times daily as needed (muscle spasm)., Disp: 30 tablet, Rfl: 0    mupirocin (BACTROBAN) 2 % ointment, Apply topically 3 (three) times daily., Disp: 30 g, Rfl: 0    naproxen (NAPROSYN) 500 MG tablet, Take 1 tablet (500 mg total) by mouth 2 (two) times daily with meals. for 10 days, Disp: 20 tablet, Rfl: 0    omeprazole (PRILOSEC) 20 MG capsule, Take 1 capsule (20 mg total) by mouth once daily., Disp: 30 capsule, Rfl: 0    ALLERGIES    Review of patient's allergies indicates:   Allergen Reactions     "Penicillins Hives    Tramadol Rash       SURGICAL HISTORY    Past Surgical History:   Procedure Laterality Date    HYSTERECTOMY      KNEE SURGERY      TOTAL ABDOMINAL HYSTERECTOMY W/ BILATERAL SALPINGOOPHORECTOMY         SOCIAL HISTORY    Social History     Socioeconomic History    Marital status:    Tobacco Use    Smoking status: Every Day     Current packs/day: 0.50     Types: Vaping with nicotine, Cigarettes    Smokeless tobacco: Never   Substance and Sexual Activity    Alcohol use: Not Currently     Comment: occ    Drug use: No    Sexual activity: Yes     Birth control/protection: See Surgical Hx       FAMILY HISTORY    No family history on file.    REVIEW OF SYSTEMS    Review of Systems   Musculoskeletal:  Positive for back pain, falls and myalgias.     All other systems reviewed and are negative    VITAL SIGNS:   /80   Pulse 85   Temp 98.2 °F (36.8 °C) (Oral)   Resp 20   Ht 5' 3" (1.6 m)   Wt 75.8 kg (167 lb 1.7 oz)   SpO2 99%   Breastfeeding No   BMI 29.60 kg/m²      Physical Exam  Constitutional:       Appearance: Normal appearance.   HENT:      Head: Normocephalic.   Cardiovascular:      Rate and Rhythm: Normal rate.   Pulmonary:      Effort: Pulmonary effort is normal. No respiratory distress.      Breath sounds: Normal breath sounds.   Abdominal:      Palpations: Abdomen is soft.   Musculoskeletal:         General: Normal range of motion.      Right knee: Normal range of motion. Tenderness present over the lateral joint line.      Left knee: Normal range of motion.      Right lower leg: Normal.      Left lower leg: Normal.      Right foot: Normal. Normal capillary refill. Normal pulse.      Left foot: Normal. Normal capillary refill. Normal pulse.   Skin:     General: Skin is warm.      Capillary Refill: Capillary refill takes less than 2 seconds.   Neurological:      General: No focal deficit present.      Mental Status: She is alert.      GCS: GCS eye subscore is 4. GCS verbal " subscore is 5. GCS motor subscore is 6.   Psychiatric:         Attention and Perception: Attention normal.         Mood and Affect: Mood normal.         Speech: Speech normal.       Vitals and nursing note reviewed.     LABS    Labs Reviewed - No data to display      EKG          RADIOLOGY    No orders to display         PROCEDURES    Procedures    Medications   ketorolac injection 60 mg (60 mg Intramuscular Given 10/7/24 1852)   orphenadrine injection 60 mg (60 mg Intramuscular Given 10/7/24 1852)                Medical Decision Making  Sarah Artis is a 44 y.o. female with PMH as below who presents to ER for evaluation of fall injury. Patient states she fell from 3 step step stool toward right right side today. There were some superficial abrasions, hematoma on right leg. Patient c/o lower back and right leg pain. She is ambulatory without difficulty.there was no step off deformity.   No other specific aggravating or relieving factors otherwise.  Ddx;Fracture, strain, sprain, tendonitis   There was no step off deformity noted. There were some hematoma from contusion, physical exam did not show acute fracture or dislocation injury  Pain control given,   She was advised to follow up with PCP    Problems Addressed:  Contusion of right lower leg, initial encounter: acute illness or injury  Fall, initial encounter: acute illness or injury    Risk  Prescription drug management.           Discharge Medication List as of 10/7/2024  7:02 PM          Discharge Medication List as of 10/7/2024  7:02 PM        START taking these medications    Details   methocarbamoL (ROBAXIN) 500 MG Tab Take 1 tablet (500 mg total) by mouth 4 (four) times daily as needed (muscle spasm)., Starting Mon 10/7/2024, Normal      mupirocin (BACTROBAN) 2 % ointment Apply topically 3 (three) times daily., Starting Mon 10/7/2024, Normal             I discussed with patient and/or family/caretaker that evaluation in the ED does not suggest any  emergent or life threatening medical condition requiring immediate intervention beyond what was provided in the ED, and I believe the patient is safe for discharge.  Regardless, an unremarkable evaluation in the ED does not preclude the development or presence of a serious or life threatening condition. As such, patient was instructed to return immediately for any worsening or change in current symptoms  Patient agrees with plan of care.    DISPOSITION  Patient discharged to home in stable condition.        FINAL IMPRESSION    1. Fall, initial encounter    2. Contusion of right lower leg, initial encounter         Hannah Hoffman NP  10/07/24 9620

## 2024-11-02 ENCOUNTER — HOSPITAL ENCOUNTER (EMERGENCY)
Facility: HOSPITAL | Age: 44
Discharge: HOME | End: 2024-11-02
Attending: EMERGENCY MEDICINE
Payer: COMMERCIAL

## 2024-11-02 VITALS
BODY MASS INDEX: 29.59 KG/M2 | RESPIRATION RATE: 15 BRPM | HEIGHT: 63 IN | OXYGEN SATURATION: 99 % | HEART RATE: 81 BPM | DIASTOLIC BLOOD PRESSURE: 99 MMHG | WEIGHT: 167 LBS | SYSTOLIC BLOOD PRESSURE: 134 MMHG | TEMPERATURE: 98 F

## 2024-11-02 DIAGNOSIS — R05.9 COUGH, UNSPECIFIED TYPE: Primary | ICD-10-CM

## 2024-11-02 LAB
GROUP A STREP, MOLECULAR: NEGATIVE
INFLUENZA A, MOLECULAR: NEGATIVE
INFLUENZA B, MOLECULAR: NEGATIVE
SARS-COV-2 RDRP RESP QL NAA+PROBE: NEGATIVE
SPECIMEN SOURCE: NORMAL

## 2024-11-02 PROCEDURE — 87651 STREP A DNA AMP PROBE: CPT | Performed by: EMERGENCY MEDICINE

## 2024-11-02 PROCEDURE — 87502 INFLUENZA DNA AMP PROBE: CPT | Performed by: EMERGENCY MEDICINE

## 2024-11-02 PROCEDURE — 87635 SARS-COV-2 COVID-19 AMP PRB: CPT | Performed by: EMERGENCY MEDICINE

## 2024-11-02 PROCEDURE — 99282 EMERGENCY DEPT VISIT SF MDM: CPT

## 2024-11-02 RX ORDER — CODEINE PHOSPHATE AND GUAIFENESIN 10; 100 MG/5ML; MG/5ML
5 SOLUTION ORAL 3 TIMES DAILY PRN
Qty: 90 ML | Refills: 0 | Status: SHIPPED | OUTPATIENT
Start: 2024-11-02 | End: 2024-11-12

## 2024-11-02 NOTE — DISCHARGE INSTRUCTIONS
Your lab for COVID, flu, and strep.  Your symptoms are probably because of a viral illness.  Take the prescribed cough medicine, but do not combine it with any other sedating medications such as alcohol, sleeping pills etc..  Also, do not drive or drink alcohol while using this medication.  Take over-the-counter cold medicines for runny nose and congestion.  Take Tylenol and Motrin for pain.  Follow-up with your doctor and return here as needed or if worse in any way.

## 2024-11-02 NOTE — ED NOTES
See triage notes.     Pain:  Rated 7/10.     Psychosocial:  Patient is calm and cooperative.  Patients insight and judgement are appropriate to situation.  Appears clean, well maintained, with clothing appropriate to environment.  No evidence of delusions, hallucinations, or psychosis.     Neuro:  Eyes open spontaneously.  Awake, alert, oriented x 4.  Speech clear and appropriate.  Tolerating saliva secretions well.  Able to follow commands, demonstrating ability to actively and appropriately communicate within context of current conversation.  Symmetrical facial muscles.  Moving all extremities well with no noted weakness.  Adequate muscle tone present.    Movement is purposeful.       Airway:  Bilateral chest rise and fall.  RR regular and non-labored.        Circulatory:  Skin warm, dry, and pink.  Capillary refill/skin blanching less than 3 seconds to distal of upper extremities.     Extremities:  No redness, heat, swelling, deformity, or pain.     Skin:  Intact with no bruising/discolorations noted.

## 2024-11-02 NOTE — ED PROVIDER NOTES
Encounter Date: 11/2/2024       History     Chief Complaint   Patient presents with    Sore Throat     Sore throat, cough and runny nose for a couple of days.  Denies fever.  Denies sputum production.  No nausea.  No vomiting.  No diarrhea.     44-year-old female, here from home via private vehicle for evaluation and treatment of sore throat, nasal congestion, rhinorrhea, and a nonproductive cough.  Denies fever or chills.  No nausea vomiting, no loose stools.  No dysuria or hematuria.  Symptoms present the past 2 days.  Has not had any medications at home for symptoms.  No sick contacts that she knows of.  Past medical history of asthma, but has not had any significant symptoms for many years.  Denies any wheezing at present.      Review of patient's allergies indicates:   Allergen Reactions    Penicillins Hives    Tramadol Rash     Past Medical History:   Diagnosis Date    Anxiety     Asthma     Bipolar 1 disorder     Bipolar 2 disorder     Chronic back pain     Depression     Hypertension     Kidney stone     PTSD (post-traumatic stress disorder)     Spinal stenosis      Past Surgical History:   Procedure Laterality Date    HYSTERECTOMY      KNEE SURGERY      TOTAL ABDOMINAL HYSTERECTOMY W/ BILATERAL SALPINGOOPHORECTOMY       No family history on file.  Social History     Tobacco Use    Smoking status: Every Day     Current packs/day: 0.50     Types: Vaping with nicotine, Cigarettes    Smokeless tobacco: Never   Substance Use Topics    Alcohol use: Not Currently     Comment: occ    Drug use: No     Review of Systems   Constitutional:  Negative for chills and fever.   HENT:  Positive for congestion, rhinorrhea and sore throat.    Respiratory:  Positive for cough and shortness of breath (Only when coughing). Negative for wheezing.    All other systems reviewed and are negative.      Physical Exam     Initial Vitals [11/02/24 0856]   BP Pulse Resp Temp SpO2   (!) 134/99 81 15 98.4 °F (36.9 °C) 99 %      MAP       --          Physical Exam    Nursing note and vitals reviewed.  Constitutional: She appears well-developed and well-nourished. She is not diaphoretic. No distress.   HENT:   Head: Normocephalic and atraumatic.   Nose: Nose normal. Mouth/Throat: Oropharynx is clear and moist. No oropharyngeal exudate.   Posterior oropharynx is normal in appearance.  No erythema or edema, no obvious postnasal drip, no exudates.   Eyes: Conjunctivae and EOM are normal. Pupils are equal, round, and reactive to light. No scleral icterus.   Neck: Neck supple. No JVD present.   Normal range of motion.  Cardiovascular:  Normal rate, regular rhythm, normal heart sounds and intact distal pulses.           No murmur heard.  Pulmonary/Chest: Breath sounds normal. No stridor. No respiratory distress. She has no wheezes. She has no rhonchi. She has no rales.   Abdominal: Abdomen is soft. Bowel sounds are normal. She exhibits no distension. There is no abdominal tenderness.   Musculoskeletal:         General: No tenderness or edema. Normal range of motion.      Cervical back: Normal range of motion and neck supple.     Neurological: She is alert and oriented to person, place, and time. She has normal strength. No cranial nerve deficit or sensory deficit. GCS score is 15. GCS eye subscore is 4. GCS verbal subscore is 5. GCS motor subscore is 6.   Skin: Skin is warm and dry. Capillary refill takes less than 2 seconds. No rash noted. No erythema.   Psychiatric: She has a normal mood and affect. Her behavior is normal.         ED Course   Procedures  Labs Reviewed   INFLUENZA A & B BY MOLECULAR       Result Value    Influenza A, Molecular Negative      Influenza B, Molecular Negative      Flu A & B Source Nasal Swab     GROUP A STREP, MOLECULAR    Group A Strep, Molecular Negative     THROAT SCREEN, RAPID STREP   SARS-COV-2 RNA AMPLIFICATION, QUAL    SARS-CoV-2 RNA, Amplification, Qual Negative            Imaging Results    None          Medications - No  data to display  Medical Decision Making  Differential includes streptococcal pharyngitis, COVID-19, influenza, other viral illness, Etc..      Patient was negative for COVID, flu, strep.  Likely viral etiology.  Symptoms are mild.  Will prescribe something for her cough, and she will use over-the-counter medications for other symptoms.Follow-up with her PCP, return here as needed or if worse in any way.      Risk  OTC drugs.                                      Clinical Impression:  Final diagnoses:  [R05.9] Cough, unspecified type (Primary)                 Arnel Ramos MD  11/02/24 1021

## 2025-02-24 ENCOUNTER — HOSPITAL ENCOUNTER (EMERGENCY)
Facility: HOSPITAL | Age: 45
Discharge: HOME | End: 2025-02-24
Attending: STUDENT IN AN ORGANIZED HEALTH CARE EDUCATION/TRAINING PROGRAM
Payer: COMMERCIAL

## 2025-02-24 VITALS
TEMPERATURE: 98 F | RESPIRATION RATE: 16 BRPM | HEIGHT: 63 IN | DIASTOLIC BLOOD PRESSURE: 81 MMHG | BODY MASS INDEX: 30.65 KG/M2 | WEIGHT: 173 LBS | HEART RATE: 95 BPM | SYSTOLIC BLOOD PRESSURE: 139 MMHG | OXYGEN SATURATION: 100 %

## 2025-02-24 DIAGNOSIS — M25.469 SWELLING OF KNEE: ICD-10-CM

## 2025-02-24 DIAGNOSIS — M25.562 ACUTE PAIN OF LEFT KNEE: Primary | ICD-10-CM

## 2025-02-24 PROCEDURE — 73562 X-RAY EXAM OF KNEE 3: CPT | Mod: 26,LT,, | Performed by: RADIOLOGY

## 2025-02-24 PROCEDURE — 96372 THER/PROPH/DIAG INJ SC/IM: CPT | Performed by: STUDENT IN AN ORGANIZED HEALTH CARE EDUCATION/TRAINING PROGRAM

## 2025-02-24 PROCEDURE — 73562 X-RAY EXAM OF KNEE 3: CPT | Mod: TC,LT

## 2025-02-24 PROCEDURE — 99284 EMERGENCY DEPT VISIT MOD MDM: CPT | Mod: 25

## 2025-02-24 PROCEDURE — 63600175 PHARM REV CODE 636 W HCPCS: Mod: JZ,TB | Performed by: STUDENT IN AN ORGANIZED HEALTH CARE EDUCATION/TRAINING PROGRAM

## 2025-02-24 RX ORDER — KETOROLAC TROMETHAMINE 30 MG/ML
30 INJECTION, SOLUTION INTRAMUSCULAR; INTRAVENOUS
Status: COMPLETED | OUTPATIENT
Start: 2025-02-24 | End: 2025-02-24

## 2025-02-24 RX ORDER — KETOROLAC TROMETHAMINE 10 MG/1
10 TABLET, FILM COATED ORAL EVERY 6 HOURS
Qty: 20 TABLET | Refills: 0 | Status: SHIPPED | OUTPATIENT
Start: 2025-02-24 | End: 2025-03-01

## 2025-02-24 RX ADMIN — KETOROLAC TROMETHAMINE 30 MG: 30 INJECTION, SOLUTION INTRAMUSCULAR; INTRAVENOUS at 07:02

## 2025-02-24 NOTE — Clinical Note
"Sarah Sharma" Chandra was seen and treated in our emergency department on 2/24/2025.  She may return to work on 02/25/2025.       If you have any questions or concerns, please don't hesitate to call.      Frederick WARREN RN    "

## 2025-02-24 NOTE — Clinical Note
mukundlanie Artis accompanied their mother to the emergency department on 2/24/2025. They may return to work on 02/24/2025.      If you have any questions or concerns, please don't hesitate to call.      BENJAMIN Rossi RN

## 2025-02-24 NOTE — DISCHARGE INSTRUCTIONS
Rest. Allow your injury to heal before you do slow movements.    Place an ice pack or a bag of frozen peas wrapped in a towel over the painful part. Never put ice right on the skin. Do not leave the ice on more than 10 to 15 minutes at a time. Ice after activity may help decrease pain and swelling. Never ice before stretching.    Prop your knee on pillows to help with swelling.    May take Toradol every 6 hours if pain    Follow up with your primary care physician

## 2025-02-24 NOTE — Clinical Note
Yoly Artis accompanied their child to the emergency department on 2/24/2025. They may return to work on 02/24/2025.      If you have any questions or concerns, please don't hesitate to call.      BENJAMIN Rossi RN

## 2025-02-24 NOTE — Clinical Note
"Sarah Sharma" Chandra was seen and treated in our emergency department on 2/24/2025.  She may return to work on 02/26/2025.       If you have any questions or concerns, please don't hesitate to call.      Javy Simpson MD"

## 2025-02-24 NOTE — ED PROVIDER NOTES
Encounter Date: 2/24/2025       History     Chief Complaint   Patient presents with    Knee Pain     Patient states left knee pain and swelling after dancing in two parades this weekend.  Patient denies injury.     45-year-old female with history of remote left knee surgery. She presents to ED with chief complaints of left knee swelling, associated anterior left knee pain with onset while participating in a parade, and dance over the weekend. She denies fall or specific trauma. Pain is worsened with ambulation, improves with rest. She denies associated motor or sensory deficits    The history is provided by the patient. No  was used.     Review of patient's allergies indicates:   Allergen Reactions    Penicillins Hives    Tramadol Rash     Past Medical History:   Diagnosis Date    Anxiety     Asthma     Bipolar 1 disorder     Bipolar 2 disorder     Chronic back pain     Depression     Hypertension     Kidney stone     PTSD (post-traumatic stress disorder)     Spinal stenosis      Past Surgical History:   Procedure Laterality Date    HYSTERECTOMY      KNEE SURGERY      TOTAL ABDOMINAL HYSTERECTOMY W/ BILATERAL SALPINGOOPHORECTOMY       No family history on file.  Social History[1]  Review of Systems   Constitutional: Negative.    HENT: Negative.     Eyes: Negative.    Respiratory: Negative.     Cardiovascular: Negative.    Gastrointestinal: Negative.    Endocrine: Negative.    Genitourinary: Negative.    Musculoskeletal:  Positive for arthralgias and joint swelling.   Skin: Negative.    Neurological: Negative.    Hematological: Negative.    Psychiatric/Behavioral: Negative.     All other systems reviewed and are negative.      Physical Exam     Initial Vitals [02/24/25 0641]   BP Pulse Resp Temp SpO2   139/81 95 16 -- 100 %      MAP       --         Physical Exam    Nursing note and vitals reviewed.  Constitutional: She appears well-developed.   HENT:   Head: Normocephalic.   Eyes: Pupils are  equal, round, and reactive to light.   Neck:   Normal range of motion.  Cardiovascular:  Normal rate.           Pulmonary/Chest: Breath sounds normal.   Abdominal: Bowel sounds are normal.   Musculoskeletal:         General: Normal range of motion.      Cervical back: Normal range of motion.      Comments: Mild swelling left anterior knee, normal-appearing overlying skin, range of motion intact.  Pedal pulses easily palpable     Neurological: She is oriented to person, place, and time. She has normal strength. GCS score is 15. GCS eye subscore is 4. GCS verbal subscore is 5. GCS motor subscore is 6.   Skin: Skin is warm. Capillary refill takes less than 2 seconds.         ED Course   Procedures  Labs Reviewed - No data to display       Imaging Results              X-Ray Knee 3 View Left (Final result)  Result time 02/24/25 07:18:52      Final result by Kamran Sanford MD (02/24/25 07:18:52)                   Impression:      No convincing evidence of acute fracture or dislocation.      Electronically signed by: Kamran Sanford  Date:    02/24/2025  Time:    07:18               Narrative:    EXAMINATION:  XR KNEE 3 VIEW LEFT    CLINICAL HISTORY:  Effusion, unspecified knee    TECHNIQUE:  AP, lateral, and Merchant views of the left knee were performed.    COMPARISON:  None    FINDINGS:  No evidence of acute fracture or dislocation.  Joint spaces appear maintained.  No evidence of radiopaque foreign body.  No large joint effusion.                                       Medications   ketorolac injection 30 mg (30 mg Intramuscular Given 2/24/25 0700)     Medical Decision Making  Ddx fracture, dislocation, contusion, sprain, others  X-ray showed no fracture or dislocation, or acute findings  Advised RICE therapy  NSAIDs p.r.n.  Patient was seen and reevaluated. Patient's symptoms seem to be stable. I discussed the patient's diagnosis, treatment plan, and plan for discharge with the patient. Patient was instructed to  follow up with PCP and was given strict return precautions to the ED. The patient voiced understanding and agreed with the plan      Amount and/or Complexity of Data Reviewed  Radiology: ordered.    Risk  Prescription drug management.                                      Clinical Impression:  Final diagnoses:  [M25.469] Swelling of knee  [M25.562] Acute pain of left knee (Primary)          ED Disposition Condition    Discharge Stable          ED Prescriptions       Medication Sig Dispense Start Date End Date Auth. Provider    ketorolac (TORADOL) 10 mg tablet Take 1 tablet (10 mg total) by mouth every 6 (six) hours. for 5 days 20 tablet 2/24/2025 3/1/2025 Javy Simpson MD          Follow-up Information       Follow up With Specialties Details Why Contact Info    [place] - Emergency Dept Emergency Medicine  As needed [address] [facility] [address] [phone]                 [1]   Social History  Tobacco Use    Smoking status: Every Day     Current packs/day: 0.50     Types: Vaping with nicotine, Cigarettes    Smokeless tobacco: Never   Substance Use Topics    Alcohol use: Not Currently     Comment: occ    Drug use: No        Javy Simpson MD  02/24/25 0796

## 2025-08-21 ENCOUNTER — HOSPITAL ENCOUNTER (OUTPATIENT)
Dept: RADIOLOGY | Facility: HOSPITAL | Age: 45
Discharge: HOME | End: 2025-08-21
Payer: COMMERCIAL

## 2025-08-21 DIAGNOSIS — Z13.820 SCREENING FOR OSTEOPOROSIS: ICD-10-CM

## 2025-08-21 DIAGNOSIS — Z01.419 WELL WOMAN EXAM WITH ROUTINE GYNECOLOGICAL EXAM: ICD-10-CM

## 2025-08-21 DIAGNOSIS — E89.40 SURGICAL MENOPAUSE: ICD-10-CM

## 2025-08-21 DIAGNOSIS — Z12.31 SCREENING MAMMOGRAM FOR BREAST CANCER: ICD-10-CM

## 2025-08-21 PROCEDURE — 77067 SCR MAMMO BI INCL CAD: CPT | Mod: 26,,, | Performed by: RADIOLOGY

## 2025-08-21 PROCEDURE — 77067 SCR MAMMO BI INCL CAD: CPT | Mod: TC

## 2025-08-21 PROCEDURE — 77080 DXA BONE DENSITY AXIAL: CPT | Mod: TC

## 2025-08-21 PROCEDURE — 77063 BREAST TOMOSYNTHESIS BI: CPT | Mod: 26,,, | Performed by: RADIOLOGY

## 2025-08-21 PROCEDURE — 77080 DXA BONE DENSITY AXIAL: CPT | Mod: 26,,, | Performed by: RADIOLOGY
